# Patient Record
Sex: FEMALE | Race: WHITE | NOT HISPANIC OR LATINO | Employment: OTHER | URBAN - NONMETROPOLITAN AREA
[De-identification: names, ages, dates, MRNs, and addresses within clinical notes are randomized per-mention and may not be internally consistent; named-entity substitution may affect disease eponyms.]

---

## 2017-03-24 ENCOUNTER — APPOINTMENT (OUTPATIENT)
Dept: GENERAL RADIOLOGY | Facility: HOSPITAL | Age: 82
End: 2017-03-24

## 2017-03-24 ENCOUNTER — HOSPITAL ENCOUNTER (INPATIENT)
Facility: HOSPITAL | Age: 82
LOS: 1 days | Discharge: HOME | End: 2017-03-26
Attending: FAMILY MEDICINE | Admitting: INTERNAL MEDICINE

## 2017-03-24 ENCOUNTER — APPOINTMENT (OUTPATIENT)
Dept: MRI IMAGING | Facility: HOSPITAL | Age: 82
End: 2017-03-24

## 2017-03-24 ENCOUNTER — APPOINTMENT (OUTPATIENT)
Dept: CT IMAGING | Facility: HOSPITAL | Age: 82
End: 2017-03-24

## 2017-03-24 DIAGNOSIS — R41.82 ALTERED MENTAL STATUS, UNSPECIFIED ALTERED MENTAL STATUS TYPE: ICD-10-CM

## 2017-03-24 DIAGNOSIS — G89.29 CHRONIC BACK PAIN, UNSPECIFIED BACK LOCATION, UNSPECIFIED BACK PAIN LATERALITY: ICD-10-CM

## 2017-03-24 DIAGNOSIS — M54.9 CHRONIC BACK PAIN, UNSPECIFIED BACK LOCATION, UNSPECIFIED BACK PAIN LATERALITY: ICD-10-CM

## 2017-03-24 DIAGNOSIS — R50.9 FEVER IN ADULT: Primary | ICD-10-CM

## 2017-03-24 LAB
ALBUMIN SERPL-MCNC: 4.1 G/DL (ref 3.5–5)
ALBUMIN/GLOB SERPL: 1.4 G/DL (ref 1.1–2.5)
ALP SERPL-CCNC: 62 U/L (ref 24–120)
ALT SERPL W P-5'-P-CCNC: 20 U/L (ref 0–54)
ANION GAP SERPL CALCULATED.3IONS-SCNC: 13 MMOL/L (ref 4–13)
ARTERIAL PATENCY WRIST A: ABNORMAL
AST SERPL-CCNC: 40 U/L (ref 7–45)
ATMOSPHERIC PRESS: ABNORMAL MMHG
BASE EXCESS BLDA CALC-SCNC: -3.9 MMOL/L (ref -2–2)
BASOPHILS # BLD AUTO: 0.03 10*3/MM3 (ref 0–0.2)
BASOPHILS NFR BLD AUTO: 0.4 % (ref 0–2)
BDY SITE: ABNORMAL
BILIRUB SERPL-MCNC: 0.5 MG/DL (ref 0.1–1)
BILIRUB UR QL STRIP: NEGATIVE
BUN BLD-MCNC: 36 MG/DL (ref 5–21)
BUN/CREAT SERPL: 29.5 (ref 7–25)
CALCIUM SPEC-SCNC: 7.6 MG/DL (ref 8.4–10.4)
CHLORIDE SERPL-SCNC: 104 MMOL/L (ref 98–110)
CK MB SERPL-CCNC: 1.99 NG/ML (ref 0–5)
CLARITY UR: CLEAR
CO2 SERPL-SCNC: 26 MMOL/L (ref 24–31)
COLOR UR: YELLOW
CREAT BLD-MCNC: 1.22 MG/DL (ref 0.5–1.4)
CRP SERPL-MCNC: 4.42 MG/DL (ref 0–0.99)
D-LACTATE SERPL-SCNC: 0.6 MMOL/L (ref 0.5–2)
DEPRECATED RDW RBC AUTO: 53.4 FL (ref 40–54)
EOSINOPHIL # BLD AUTO: 0.21 10*3/MM3 (ref 0–0.7)
EOSINOPHIL NFR BLD AUTO: 2.6 % (ref 0–4)
ERYTHROCYTE [DISTWIDTH] IN BLOOD BY AUTOMATED COUNT: 14.9 % (ref 12–15)
ERYTHROCYTE [SEDIMENTATION RATE] IN BLOOD: 16 MM/HR (ref 0–20)
FLUAV AG NPH QL: NEGATIVE
FLUBV AG NPH QL IA: NEGATIVE
GAS FLOW AIRWAY: 3 LPM
GFR SERPL CREATININE-BSD FRML MDRD: 42 ML/MIN/1.73
GLOBULIN UR ELPH-MCNC: 2.9 GM/DL
GLUCOSE BLD-MCNC: 132 MG/DL (ref 70–100)
GLUCOSE UR STRIP-MCNC: NEGATIVE MG/DL
HCO3 BLDA-SCNC: 21.8 MMOL/L (ref 22–26)
HCT VFR BLD AUTO: 40.5 % (ref 37–47)
HGB BLD-MCNC: 12.7 G/DL (ref 12–16)
HGB UR QL STRIP.AUTO: NEGATIVE
IMM GRANULOCYTES # BLD: 0.04 10*3/MM3 (ref 0–0.03)
IMM GRANULOCYTES NFR BLD: 0.5 % (ref 0–5)
KETONES UR QL STRIP: NEGATIVE
LEUKOCYTE ESTERASE UR QL STRIP.AUTO: NEGATIVE
LYMPHOCYTES # BLD AUTO: 0.34 10*3/MM3 (ref 0.72–4.86)
LYMPHOCYTES NFR BLD AUTO: 4.2 % (ref 15–45)
MCH RBC QN AUTO: 30.7 PG (ref 28–32)
MCHC RBC AUTO-ENTMCNC: 31.4 G/DL (ref 33–36)
MCV RBC AUTO: 97.8 FL (ref 82–98)
MODALITY: ABNORMAL
MONOCYTES # BLD AUTO: 0.4 10*3/MM3 (ref 0.19–1.3)
MONOCYTES NFR BLD AUTO: 5 % (ref 4–12)
MYOGLOBIN SERPL-MCNC: 71.6 NG/ML (ref 0–110)
NEUTROPHILS # BLD AUTO: 7.02 10*3/MM3 (ref 1.87–8.4)
NEUTROPHILS NFR BLD AUTO: 87.3 % (ref 39–78)
NITRITE UR QL STRIP: NEGATIVE
PCO2 BLDA: 42.1 MM HG (ref 35–45)
PH BLDA: 7.33 PH UNITS (ref 7.35–7.45)
PH UR STRIP.AUTO: <=5 [PH] (ref 5–8)
PLATELET # BLD AUTO: 261 10*3/MM3 (ref 130–400)
PMV BLD AUTO: 11.3 FL (ref 6–12)
PO2 BLDA: 88.5 MM HG (ref 80–100)
POTASSIUM BLD-SCNC: 4.3 MMOL/L (ref 3.5–5.3)
PROCALCITONIN SERPL-MCNC: 0.3 NG/ML
PROT SERPL-MCNC: 7 G/DL (ref 6.3–8.7)
PROT UR QL STRIP: NEGATIVE
RBC # BLD AUTO: 4.14 10*6/MM3 (ref 4.2–5.4)
SAO2 % BLDCOA: 96.2 % (ref 94–100)
SAO2 % BLDCOA: 96.2 % (ref 94–100)
SODIUM BLD-SCNC: 143 MMOL/L (ref 135–145)
SP GR UR STRIP: 1.01 (ref 1–1.03)
UROBILINOGEN UR QL STRIP: NORMAL
WBC NRBC COR # BLD: 8.04 10*3/MM3 (ref 4.8–10.8)

## 2017-03-24 PROCEDURE — 85025 COMPLETE CBC W/AUTO DIFF WBC: CPT | Performed by: FAMILY MEDICINE

## 2017-03-24 PROCEDURE — 82553 CREATINE MB FRACTION: CPT | Performed by: FAMILY MEDICINE

## 2017-03-24 PROCEDURE — 81003 URINALYSIS AUTO W/O SCOPE: CPT | Performed by: FAMILY MEDICINE

## 2017-03-24 PROCEDURE — 70450 CT HEAD/BRAIN W/O DYE: CPT

## 2017-03-24 PROCEDURE — 93010 ELECTROCARDIOGRAM REPORT: CPT | Performed by: INTERNAL MEDICINE

## 2017-03-24 PROCEDURE — 82803 BLOOD GASES ANY COMBINATION: CPT

## 2017-03-24 PROCEDURE — 71010 HC CHEST PA OR AP: CPT

## 2017-03-24 PROCEDURE — 72148 MRI LUMBAR SPINE W/O DYE: CPT

## 2017-03-24 PROCEDURE — 87040 BLOOD CULTURE FOR BACTERIA: CPT | Performed by: FAMILY MEDICINE

## 2017-03-24 PROCEDURE — 25010000002 ONDANSETRON PER 1 MG: Performed by: FAMILY MEDICINE

## 2017-03-24 PROCEDURE — 85651 RBC SED RATE NONAUTOMATED: CPT | Performed by: FAMILY MEDICINE

## 2017-03-24 PROCEDURE — 83605 ASSAY OF LACTIC ACID: CPT | Performed by: FAMILY MEDICINE

## 2017-03-24 PROCEDURE — P9612 CATHETERIZE FOR URINE SPEC: HCPCS

## 2017-03-24 PROCEDURE — 36600 WITHDRAWAL OF ARTERIAL BLOOD: CPT

## 2017-03-24 PROCEDURE — 87804 INFLUENZA ASSAY W/OPTIC: CPT | Performed by: FAMILY MEDICINE

## 2017-03-24 PROCEDURE — 83874 ASSAY OF MYOGLOBIN: CPT | Performed by: FAMILY MEDICINE

## 2017-03-24 PROCEDURE — 84145 PROCALCITONIN (PCT): CPT | Performed by: FAMILY MEDICINE

## 2017-03-24 PROCEDURE — 99285 EMERGENCY DEPT VISIT HI MDM: CPT

## 2017-03-24 PROCEDURE — 86140 C-REACTIVE PROTEIN: CPT | Performed by: FAMILY MEDICINE

## 2017-03-24 PROCEDURE — 25010000002 AZITHROMYCIN: Performed by: FAMILY MEDICINE

## 2017-03-24 PROCEDURE — 80053 COMPREHEN METABOLIC PANEL: CPT | Performed by: FAMILY MEDICINE

## 2017-03-24 PROCEDURE — 93005 ELECTROCARDIOGRAM TRACING: CPT | Performed by: FAMILY MEDICINE

## 2017-03-24 RX ORDER — ATORVASTATIN CALCIUM 80 MG/1
80 TABLET, FILM COATED ORAL DAILY
COMMUNITY

## 2017-03-24 RX ORDER — ISOSORBIDE MONONITRATE 60 MG/1
90 TABLET, EXTENDED RELEASE ORAL DAILY
COMMUNITY

## 2017-03-24 RX ORDER — SODIUM CHLORIDE 9 MG/ML
125 INJECTION, SOLUTION INTRAVENOUS CONTINUOUS
Status: DISCONTINUED | OUTPATIENT
Start: 2017-03-24 | End: 2017-03-25

## 2017-03-24 RX ORDER — SENNA PLUS 8.6 MG/1
1 TABLET ORAL DAILY PRN
COMMUNITY
End: 2020-01-06 | Stop reason: HOSPADM

## 2017-03-24 RX ORDER — ASPIRIN 81 MG/1
81 TABLET ORAL DAILY
COMMUNITY

## 2017-03-24 RX ORDER — SERTRALINE HYDROCHLORIDE 100 MG/1
100 TABLET, FILM COATED ORAL DAILY
COMMUNITY

## 2017-03-24 RX ORDER — BUDESONIDE AND FORMOTEROL FUMARATE DIHYDRATE 160; 4.5 UG/1; UG/1
2 AEROSOL RESPIRATORY (INHALATION)
COMMUNITY

## 2017-03-24 RX ORDER — ATENOLOL 25 MG/1
12.5 TABLET ORAL DAILY
COMMUNITY

## 2017-03-24 RX ORDER — OMEPRAZOLE 40 MG/1
40 CAPSULE, DELAYED RELEASE ORAL 2 TIMES DAILY
COMMUNITY

## 2017-03-24 RX ORDER — GABAPENTIN 300 MG/1
600 CAPSULE ORAL 2 TIMES DAILY
Status: ON HOLD | COMMUNITY
End: 2020-01-06 | Stop reason: SDUPTHER

## 2017-03-24 RX ORDER — ONDANSETRON 2 MG/ML
4 INJECTION INTRAMUSCULAR; INTRAVENOUS ONCE
Status: COMPLETED | OUTPATIENT
Start: 2017-03-24 | End: 2017-03-24

## 2017-03-24 RX ORDER — FUROSEMIDE 40 MG/1
TABLET ORAL TAKE AS DIRECTED
Status: ON HOLD | COMMUNITY
End: 2020-01-06 | Stop reason: SDUPTHER

## 2017-03-24 RX ORDER — OXYCODONE AND ACETAMINOPHEN 10; 325 MG/1; MG/1
1 TABLET ORAL EVERY 6 HOURS PRN
Status: ON HOLD | COMMUNITY
End: 2020-01-06 | Stop reason: SDUPTHER

## 2017-03-24 RX ADMIN — AZITHROMYCIN 500 MG: 500 INJECTION, POWDER, LYOPHILIZED, FOR SOLUTION INTRAVENOUS at 21:16

## 2017-03-24 RX ADMIN — ACETAMINOPHEN 650 MG: 325 SUPPOSITORY RECTAL at 21:27

## 2017-03-24 RX ADMIN — ONDANSETRON HYDROCHLORIDE 4 MG: 2 SOLUTION INTRAMUSCULAR; INTRAVENOUS at 21:16

## 2017-03-24 RX ADMIN — SODIUM CHLORIDE 1000 ML: 9 INJECTION, SOLUTION INTRAVENOUS at 21:17

## 2017-03-25 ENCOUNTER — APPOINTMENT (OUTPATIENT)
Dept: MRI IMAGING | Facility: HOSPITAL | Age: 82
End: 2017-03-25

## 2017-03-25 PROBLEM — R41.82 ALTERED MENTAL STATUS: Status: ACTIVE | Noted: 2017-03-25

## 2017-03-25 PROBLEM — R50.9 FEVER IN ADULT: Status: ACTIVE | Noted: 2017-03-25

## 2017-03-25 LAB
AMMONIA BLD-SCNC: <9 UMOL/L (ref 9–33)
APAP SERPL-MCNC: <10 MCG/ML (ref 10–30)
HOLD SPECIMEN: NORMAL
HOLD SPECIMEN: NORMAL
LIPASE SERPL-CCNC: 19 U/L (ref 23–203)
SALICYLATES SERPL-MCNC: <1 MG/DL (ref 15–30)
TROPONIN I SERPL-MCNC: <0.012 NG/ML (ref 0–0.03)
TROPONIN I SERPL-MCNC: <0.012 NG/ML (ref 0–0.03)
WHOLE BLOOD HOLD SPECIMEN: NORMAL
WHOLE BLOOD HOLD SPECIMEN: NORMAL

## 2017-03-25 PROCEDURE — 25010000002 MEROPENEM PER 100 MG: Performed by: INTERNAL MEDICINE

## 2017-03-25 PROCEDURE — 25010000002 ACYCLOVIR PER 5 MG: Performed by: INTERNAL MEDICINE

## 2017-03-25 PROCEDURE — 87040 BLOOD CULTURE FOR BACTERIA: CPT | Performed by: INTERNAL MEDICINE

## 2017-03-25 PROCEDURE — 25010000002 VANCOMYCIN: Performed by: INTERNAL MEDICINE

## 2017-03-25 PROCEDURE — 25010000002 ENOXAPARIN PER 10 MG: Performed by: NURSE PRACTITIONER

## 2017-03-25 PROCEDURE — 94799 UNLISTED PULMONARY SVC/PX: CPT

## 2017-03-25 PROCEDURE — 82140 ASSAY OF AMMONIA: CPT | Performed by: INTERNAL MEDICINE

## 2017-03-25 PROCEDURE — 63710000001 METHYLPREDNISOLONE 4 MG TABLET THERAPY PACK: Performed by: NURSE PRACTITIONER

## 2017-03-25 PROCEDURE — 84484 ASSAY OF TROPONIN QUANT: CPT | Performed by: INTERNAL MEDICINE

## 2017-03-25 PROCEDURE — 80307 DRUG TEST PRSMV CHEM ANLYZR: CPT | Performed by: INTERNAL MEDICINE

## 2017-03-25 PROCEDURE — 70551 MRI BRAIN STEM W/O DYE: CPT

## 2017-03-25 PROCEDURE — 94640 AIRWAY INHALATION TREATMENT: CPT

## 2017-03-25 PROCEDURE — 83690 ASSAY OF LIPASE: CPT | Performed by: INTERNAL MEDICINE

## 2017-03-25 PROCEDURE — 25010000003 DEXAMETHASONE SODIUM PHOSPHATE 100 MG/10ML SOLUTION 10 ML VIAL: Performed by: NURSE PRACTITIONER

## 2017-03-25 RX ORDER — GABAPENTIN 300 MG/1
300 CAPSULE ORAL EVERY MORNING
Status: DISCONTINUED | OUTPATIENT
Start: 2017-03-25 | End: 2017-03-26 | Stop reason: HOSPADM

## 2017-03-25 RX ORDER — NALOXONE HYDROCHLORIDE 1 MG/ML
1 INJECTION INTRAMUSCULAR; INTRAVENOUS; SUBCUTANEOUS AS NEEDED
Status: DISCONTINUED | OUTPATIENT
Start: 2017-03-25 | End: 2017-03-26 | Stop reason: HOSPADM

## 2017-03-25 RX ORDER — ASPIRIN 81 MG/1
81 TABLET ORAL DAILY
Status: DISCONTINUED | OUTPATIENT
Start: 2017-03-25 | End: 2017-03-26 | Stop reason: HOSPADM

## 2017-03-25 RX ORDER — PANTOPRAZOLE SODIUM 40 MG/10ML
40 INJECTION, POWDER, LYOPHILIZED, FOR SOLUTION INTRAVENOUS
Status: DISCONTINUED | OUTPATIENT
Start: 2017-03-25 | End: 2017-03-25 | Stop reason: ALTCHOICE

## 2017-03-25 RX ORDER — GABAPENTIN 300 MG/1
600 CAPSULE ORAL NIGHTLY
Status: DISCONTINUED | OUTPATIENT
Start: 2017-03-25 | End: 2017-03-26 | Stop reason: HOSPADM

## 2017-03-25 RX ORDER — METHYLPREDNISOLONE 4 MG/1
4 TABLET ORAL
Status: COMPLETED | OUTPATIENT
Start: 2017-03-25 | End: 2017-03-25

## 2017-03-25 RX ORDER — PANTOPRAZOLE SODIUM 40 MG/1
40 TABLET, DELAYED RELEASE ORAL
Status: DISCONTINUED | OUTPATIENT
Start: 2017-03-25 | End: 2017-03-26 | Stop reason: HOSPADM

## 2017-03-25 RX ORDER — METHYLPREDNISOLONE 4 MG/1
4 TABLET ORAL 2 TIMES DAILY
Status: DISCONTINUED | OUTPATIENT
Start: 2017-03-29 | End: 2017-03-26 | Stop reason: HOSPADM

## 2017-03-25 RX ORDER — OXYCODONE AND ACETAMINOPHEN 10; 325 MG/1; MG/1
1 TABLET ORAL EVERY 6 HOURS PRN
Status: DISCONTINUED | OUTPATIENT
Start: 2017-03-25 | End: 2017-03-25

## 2017-03-25 RX ORDER — ATENOLOL 25 MG/1
25 TABLET ORAL DAILY
Status: DISCONTINUED | OUTPATIENT
Start: 2017-03-25 | End: 2017-03-26 | Stop reason: HOSPADM

## 2017-03-25 RX ORDER — METHYLPREDNISOLONE 4 MG/1
8 TABLET ORAL
Status: COMPLETED | OUTPATIENT
Start: 2017-03-25 | End: 2017-03-25

## 2017-03-25 RX ORDER — NAPROXEN 250 MG/1
250 TABLET ORAL 2 TIMES DAILY PRN
Status: DISCONTINUED | OUTPATIENT
Start: 2017-03-25 | End: 2017-03-25

## 2017-03-25 RX ORDER — NITROGLYCERIN 0.4 MG/1
0.4 TABLET SUBLINGUAL
Status: DISCONTINUED | OUTPATIENT
Start: 2017-03-25 | End: 2017-03-26 | Stop reason: HOSPADM

## 2017-03-25 RX ORDER — DIPHENHYDRAMINE HCL 25 MG
25 CAPSULE ORAL EVERY 6 HOURS PRN
COMMUNITY
End: 2020-01-06 | Stop reason: HOSPADM

## 2017-03-25 RX ORDER — GABAPENTIN 300 MG/1
600 CAPSULE ORAL NIGHTLY
Status: ON HOLD | COMMUNITY
End: 2020-01-03

## 2017-03-25 RX ORDER — BUDESONIDE AND FORMOTEROL FUMARATE DIHYDRATE 160; 4.5 UG/1; UG/1
2 AEROSOL RESPIRATORY (INHALATION)
Status: DISCONTINUED | OUTPATIENT
Start: 2017-03-25 | End: 2017-03-26 | Stop reason: HOSPADM

## 2017-03-25 RX ORDER — SERTRALINE HYDROCHLORIDE 100 MG/1
100 TABLET, FILM COATED ORAL DAILY
Status: DISCONTINUED | OUTPATIENT
Start: 2017-03-25 | End: 2017-03-26 | Stop reason: HOSPADM

## 2017-03-25 RX ORDER — METHYLPREDNISOLONE 4 MG/1
4 TABLET ORAL
Status: DISCONTINUED | OUTPATIENT
Start: 2017-03-30 | End: 2017-03-26 | Stop reason: HOSPADM

## 2017-03-25 RX ORDER — METHYLPREDNISOLONE 4 MG/1
8 TABLET ORAL
Status: DISCONTINUED | OUTPATIENT
Start: 2017-03-26 | End: 2017-03-26 | Stop reason: HOSPADM

## 2017-03-25 RX ORDER — METHYLPREDNISOLONE 4 MG/1
4 TABLET ORAL
Status: DISCONTINUED | OUTPATIENT
Start: 2017-03-26 | End: 2017-03-26 | Stop reason: HOSPADM

## 2017-03-25 RX ORDER — NAPROXEN 250 MG/1
250 TABLET ORAL 2 TIMES DAILY PRN
Status: ON HOLD | COMMUNITY
End: 2020-01-03

## 2017-03-25 RX ORDER — SODIUM CHLORIDE 9 MG/ML
100 INJECTION, SOLUTION INTRAVENOUS CONTINUOUS
Status: DISCONTINUED | OUTPATIENT
Start: 2017-03-25 | End: 2017-03-26 | Stop reason: HOSPADM

## 2017-03-25 RX ORDER — ONDANSETRON 2 MG/ML
4 INJECTION INTRAMUSCULAR; INTRAVENOUS EVERY 6 HOURS PRN
Status: DISCONTINUED | OUTPATIENT
Start: 2017-03-25 | End: 2017-03-26 | Stop reason: HOSPADM

## 2017-03-25 RX ORDER — OXYCODONE AND ACETAMINOPHEN 10; 325 MG/1; MG/1
1 TABLET ORAL EVERY 4 HOURS PRN
Status: DISCONTINUED | OUTPATIENT
Start: 2017-03-25 | End: 2017-03-26 | Stop reason: HOSPADM

## 2017-03-25 RX ORDER — DIPHENHYDRAMINE HCL 25 MG
25 CAPSULE ORAL EVERY 6 HOURS PRN
Status: DISCONTINUED | OUTPATIENT
Start: 2017-03-25 | End: 2017-03-26 | Stop reason: HOSPADM

## 2017-03-25 RX ORDER — FUROSEMIDE 40 MG/1
40 TABLET ORAL 2 TIMES DAILY
Status: DISCONTINUED | OUTPATIENT
Start: 2017-03-25 | End: 2017-03-26 | Stop reason: HOSPADM

## 2017-03-25 RX ORDER — METHYLPREDNISOLONE 4 MG/1
4 TABLET ORAL
Status: DISCONTINUED | OUTPATIENT
Start: 2017-03-27 | End: 2017-03-26 | Stop reason: HOSPADM

## 2017-03-25 RX ORDER — ATORVASTATIN CALCIUM 40 MG/1
80 TABLET, FILM COATED ORAL NIGHTLY
Status: DISCONTINUED | OUTPATIENT
Start: 2017-03-25 | End: 2017-03-26 | Stop reason: HOSPADM

## 2017-03-25 RX ORDER — KETOTIFEN FUMARATE 0.35 MG/ML
1 SOLUTION/ DROPS OPHTHALMIC DAILY PRN
Status: ON HOLD | COMMUNITY
End: 2020-01-03

## 2017-03-25 RX ORDER — METHYLPREDNISOLONE 4 MG/1
4 TABLET ORAL
Status: DISCONTINUED | OUTPATIENT
Start: 2017-03-28 | End: 2017-03-26 | Stop reason: HOSPADM

## 2017-03-25 RX ADMIN — METHYLPREDNISOLONE 4 MG: 4 TABLET ORAL at 17:31

## 2017-03-25 RX ADMIN — MEROPENEM 2 G: 1 INJECTION INTRAVENOUS at 04:20

## 2017-03-25 RX ADMIN — SERTRALINE 100 MG: 100 TABLET, FILM COATED ORAL at 13:02

## 2017-03-25 RX ADMIN — BUDESONIDE AND FORMOTEROL FUMARATE DIHYDRATE 2 PUFF: 160; 4.5 AEROSOL RESPIRATORY (INHALATION) at 19:04

## 2017-03-25 RX ADMIN — FUROSEMIDE 40 MG: 40 TABLET ORAL at 17:35

## 2017-03-25 RX ADMIN — ENOXAPARIN SODIUM 30 MG: 30 INJECTION SUBCUTANEOUS at 13:13

## 2017-03-25 RX ADMIN — SODIUM CHLORIDE 125 ML/HR: 9 INJECTION, SOLUTION INTRAVENOUS at 01:14

## 2017-03-25 RX ADMIN — METHYLPREDNISOLONE 4 MG: 4 TABLET ORAL at 13:22

## 2017-03-25 RX ADMIN — PANTOPRAZOLE SODIUM 40 MG: 40 TABLET, DELAYED RELEASE ORAL at 13:12

## 2017-03-25 RX ADMIN — METHYLPREDNISOLONE 8 MG: 4 TABLET ORAL at 13:03

## 2017-03-25 RX ADMIN — ISOSORBIDE MONONITRATE 90 MG: 30 TABLET ORAL at 13:02

## 2017-03-25 RX ADMIN — MEROPENEM 2 G: 1 INJECTION INTRAVENOUS at 11:25

## 2017-03-25 RX ADMIN — OXYCODONE HYDROCHLORIDE AND ACETAMINOPHEN 1 TABLET: 10; 325 TABLET ORAL at 17:30

## 2017-03-25 RX ADMIN — FUROSEMIDE 40 MG: 40 TABLET ORAL at 13:12

## 2017-03-25 RX ADMIN — ATENOLOL 25 MG: 25 TABLET ORAL at 13:03

## 2017-03-25 RX ADMIN — METHYLPREDNISOLONE 8 MG: 4 TABLET ORAL at 20:20

## 2017-03-25 RX ADMIN — PANTOPRAZOLE SODIUM 40 MG: 40 INJECTION, POWDER, FOR SOLUTION INTRAVENOUS at 05:20

## 2017-03-25 RX ADMIN — GABAPENTIN 300 MG: 300 CAPSULE ORAL at 13:03

## 2017-03-25 RX ADMIN — ACYCLOVIR SODIUM 590 MG: 500 INJECTION, SOLUTION INTRAVENOUS at 13:21

## 2017-03-25 RX ADMIN — ASPIRIN 81 MG: 81 TABLET ORAL at 13:03

## 2017-03-25 RX ADMIN — GABAPENTIN 600 MG: 300 CAPSULE ORAL at 20:21

## 2017-03-25 RX ADMIN — VANCOMYCIN HYDROCHLORIDE 500 MG: 500 INJECTION, POWDER, LYOPHILIZED, FOR SOLUTION INTRAVENOUS at 05:22

## 2017-03-25 RX ADMIN — ACYCLOVIR SODIUM 590 MG: 500 INJECTION, SOLUTION INTRAVENOUS at 03:10

## 2017-03-25 RX ADMIN — DESMOPRESSIN ACETATE 80 MG: 0.2 TABLET ORAL at 20:20

## 2017-03-25 RX ADMIN — DEXAMETHASONE SODIUM PHOSPHATE 10 MG: 10 INJECTION, SOLUTION INTRAMUSCULAR; INTRAVENOUS at 13:04

## 2017-03-25 RX ADMIN — OXYCODONE HYDROCHLORIDE AND ACETAMINOPHEN 1 TABLET: 10; 325 TABLET ORAL at 11:25

## 2017-03-26 VITALS
BODY MASS INDEX: 17.71 KG/M2 | DIASTOLIC BLOOD PRESSURE: 60 MMHG | WEIGHT: 110.2 LBS | TEMPERATURE: 97.5 F | HEART RATE: 73 BPM | OXYGEN SATURATION: 95 % | SYSTOLIC BLOOD PRESSURE: 131 MMHG | HEIGHT: 66 IN | RESPIRATION RATE: 18 BRPM

## 2017-03-26 LAB
ALBUMIN SERPL-MCNC: 3.1 G/DL (ref 3.5–5)
ALBUMIN/GLOB SERPL: 1.1 G/DL (ref 1.1–2.5)
ALP SERPL-CCNC: 48 U/L (ref 24–120)
ALT SERPL W P-5'-P-CCNC: 24 U/L (ref 0–54)
ANION GAP SERPL CALCULATED.3IONS-SCNC: 8 MMOL/L (ref 4–13)
AST SERPL-CCNC: 27 U/L (ref 7–45)
BASOPHILS # BLD AUTO: 0.01 10*3/MM3 (ref 0–0.2)
BASOPHILS NFR BLD AUTO: 0.2 % (ref 0–2)
BILIRUB SERPL-MCNC: 0.3 MG/DL (ref 0.1–1)
BUN BLD-MCNC: 26 MG/DL (ref 5–21)
BUN/CREAT SERPL: 23.2 (ref 7–25)
CALCIUM SPEC-SCNC: 6.4 MG/DL (ref 8.4–10.4)
CHLORIDE SERPL-SCNC: 106 MMOL/L (ref 98–110)
CO2 SERPL-SCNC: 25 MMOL/L (ref 24–31)
CREAT BLD-MCNC: 1.12 MG/DL (ref 0.5–1.4)
CRP SERPL-MCNC: 5.8 MG/DL (ref 0–0.99)
DEPRECATED RDW RBC AUTO: 52.9 FL (ref 40–54)
EOSINOPHIL # BLD AUTO: 0 10*3/MM3 (ref 0–0.7)
EOSINOPHIL NFR BLD AUTO: 0 % (ref 0–4)
ERYTHROCYTE [DISTWIDTH] IN BLOOD BY AUTOMATED COUNT: 15 % (ref 12–15)
GFR SERPL CREATININE-BSD FRML MDRD: 46 ML/MIN/1.73
GLOBULIN UR ELPH-MCNC: 2.7 GM/DL
GLUCOSE BLD-MCNC: 117 MG/DL (ref 70–100)
HCT VFR BLD AUTO: 31.4 % (ref 37–47)
HGB BLD-MCNC: 10 G/DL (ref 12–16)
IMM GRANULOCYTES # BLD: 0.02 10*3/MM3 (ref 0–0.03)
IMM GRANULOCYTES NFR BLD: 0.4 % (ref 0–5)
LYMPHOCYTES # BLD AUTO: 0.83 10*3/MM3 (ref 0.72–4.86)
LYMPHOCYTES NFR BLD AUTO: 15 % (ref 15–45)
MCH RBC QN AUTO: 30.5 PG (ref 28–32)
MCHC RBC AUTO-ENTMCNC: 31.8 G/DL (ref 33–36)
MCV RBC AUTO: 95.7 FL (ref 82–98)
MONOCYTES # BLD AUTO: 0.54 10*3/MM3 (ref 0.19–1.3)
MONOCYTES NFR BLD AUTO: 9.7 % (ref 4–12)
NEUTROPHILS # BLD AUTO: 4.15 10*3/MM3 (ref 1.87–8.4)
NEUTROPHILS NFR BLD AUTO: 74.7 % (ref 39–78)
PLATELET # BLD AUTO: 207 10*3/MM3 (ref 130–400)
PMV BLD AUTO: 11.1 FL (ref 6–12)
POTASSIUM BLD-SCNC: 3.7 MMOL/L (ref 3.5–5.3)
PROT SERPL-MCNC: 5.8 G/DL (ref 6.3–8.7)
RBC # BLD AUTO: 3.28 10*6/MM3 (ref 4.2–5.4)
SODIUM BLD-SCNC: 139 MMOL/L (ref 135–145)
WBC NRBC COR # BLD: 5.55 10*3/MM3 (ref 4.8–10.8)

## 2017-03-26 PROCEDURE — 86140 C-REACTIVE PROTEIN: CPT | Performed by: NURSE PRACTITIONER

## 2017-03-26 PROCEDURE — 63710000001 METHYLPREDNISOLONE 4 MG TABLET THERAPY PACK: Performed by: NURSE PRACTITIONER

## 2017-03-26 PROCEDURE — 85025 COMPLETE CBC W/AUTO DIFF WBC: CPT | Performed by: NURSE PRACTITIONER

## 2017-03-26 PROCEDURE — 80053 COMPREHEN METABOLIC PANEL: CPT | Performed by: NURSE PRACTITIONER

## 2017-03-26 PROCEDURE — 94799 UNLISTED PULMONARY SVC/PX: CPT

## 2017-03-26 RX ORDER — METHYLPREDNISOLONE 4 MG/1
TABLET ORAL
Qty: 21 TABLET | Refills: 0 | Status: ON HOLD | OUTPATIENT
Start: 2017-03-26 | End: 2020-01-03

## 2017-03-26 RX ORDER — METHYLPREDNISOLONE 4 MG/1
TABLET ORAL
Qty: 21 TABLET | Refills: 0 | Status: ON HOLD | OUTPATIENT
Start: 2017-03-30 | End: 2020-01-03 | Stop reason: SDUPTHER

## 2017-03-26 RX ORDER — METHYLPREDNISOLONE 4 MG/1
TABLET ORAL
Qty: 21 TABLET | Refills: 0 | Status: ON HOLD | OUTPATIENT
Start: 2017-03-27 | End: 2020-01-03 | Stop reason: SDUPTHER

## 2017-03-26 RX ORDER — METHYLPREDNISOLONE 4 MG/1
TABLET ORAL
Qty: 21 TABLET | Refills: 0 | Status: ON HOLD | OUTPATIENT
Start: 2017-03-28 | End: 2020-01-03 | Stop reason: SDUPTHER

## 2017-03-26 RX ORDER — METHYLPREDNISOLONE 4 MG/1
TABLET ORAL
Qty: 21 TABLET | Refills: 0 | Status: ON HOLD | OUTPATIENT
Start: 2017-03-26 | End: 2020-01-03 | Stop reason: SDUPTHER

## 2017-03-26 RX ORDER — METHYLPREDNISOLONE 4 MG/1
TABLET ORAL
Qty: 21 TABLET | Refills: 0 | Status: ON HOLD | OUTPATIENT
Start: 2017-03-29 | End: 2020-01-03 | Stop reason: SDUPTHER

## 2017-03-26 RX ADMIN — ISOSORBIDE MONONITRATE 90 MG: 30 TABLET ORAL at 08:59

## 2017-03-26 RX ADMIN — BUDESONIDE AND FORMOTEROL FUMARATE DIHYDRATE 2 PUFF: 160; 4.5 AEROSOL RESPIRATORY (INHALATION) at 07:13

## 2017-03-26 RX ADMIN — FUROSEMIDE 40 MG: 40 TABLET ORAL at 09:00

## 2017-03-26 RX ADMIN — PANTOPRAZOLE SODIUM 40 MG: 40 TABLET, DELAYED RELEASE ORAL at 06:20

## 2017-03-26 RX ADMIN — ATENOLOL 25 MG: 25 TABLET ORAL at 08:59

## 2017-03-26 RX ADMIN — SERTRALINE 100 MG: 100 TABLET, FILM COATED ORAL at 09:00

## 2017-03-26 RX ADMIN — GABAPENTIN 300 MG: 300 CAPSULE ORAL at 09:00

## 2017-03-26 RX ADMIN — ASPIRIN 81 MG: 81 TABLET ORAL at 08:59

## 2017-03-26 RX ADMIN — OXYCODONE HYDROCHLORIDE AND ACETAMINOPHEN 1 TABLET: 10; 325 TABLET ORAL at 09:55

## 2017-03-26 RX ADMIN — METHYLPREDNISOLONE 4 MG: 4 TABLET ORAL at 09:00

## 2017-03-26 RX ADMIN — OXYCODONE HYDROCHLORIDE AND ACETAMINOPHEN 1 TABLET: 10; 325 TABLET ORAL at 03:35

## 2017-03-29 LAB — BACTERIA SPEC AEROBE CULT: NORMAL

## 2017-03-30 LAB — BACTERIA SPEC AEROBE CULT: NORMAL

## 2018-04-04 ENCOUNTER — OFFICE (OUTPATIENT)
Dept: URBAN - METROPOLITAN AREA CLINIC 10 | Facility: CLINIC | Age: 83
End: 2018-04-04
Payer: COMMERCIAL

## 2018-04-04 VITALS
HEART RATE: 54 BPM | HEIGHT: 64 IN | WEIGHT: 117 LBS | SYSTOLIC BLOOD PRESSURE: 108 MMHG | DIASTOLIC BLOOD PRESSURE: 53 MMHG

## 2018-04-04 DIAGNOSIS — R63.6 UNDERWEIGHT: ICD-10-CM

## 2018-04-04 DIAGNOSIS — R93.8 ABNORMAL FINDINGS ON DIAGNOSTIC IMAGING OF OTHER SPECIFIED B: ICD-10-CM

## 2018-04-04 PROCEDURE — 99204 OFFICE O/P NEW MOD 45 MIN: CPT

## 2018-04-05 ENCOUNTER — AMBULATORY SURGICAL CENTER (OUTPATIENT)
Dept: URBAN - METROPOLITAN AREA SURGERY 1 | Facility: SURGERY | Age: 83
End: 2018-04-05
Payer: COMMERCIAL

## 2018-04-05 ENCOUNTER — OFFICE (OUTPATIENT)
Dept: URBAN - METROPOLITAN AREA PATHOLOGY 22 | Facility: PATHOLOGY | Age: 83
End: 2018-04-05
Payer: COMMERCIAL

## 2018-04-05 VITALS
OXYGEN SATURATION: 100 % | TEMPERATURE: 98.3 F | SYSTOLIC BLOOD PRESSURE: 130 MMHG | SYSTOLIC BLOOD PRESSURE: 175 MMHG | DIASTOLIC BLOOD PRESSURE: 59 MMHG | OXYGEN SATURATION: 98 % | DIASTOLIC BLOOD PRESSURE: 60 MMHG | SYSTOLIC BLOOD PRESSURE: 124 MMHG | SYSTOLIC BLOOD PRESSURE: 121 MMHG | DIASTOLIC BLOOD PRESSURE: 93 MMHG | HEIGHT: 64 IN | SYSTOLIC BLOOD PRESSURE: 134 MMHG | HEART RATE: 60 BPM | SYSTOLIC BLOOD PRESSURE: 143 MMHG | DIASTOLIC BLOOD PRESSURE: 51 MMHG | DIASTOLIC BLOOD PRESSURE: 51 MMHG | HEIGHT: 64 IN | RESPIRATION RATE: 16 BRPM | DIASTOLIC BLOOD PRESSURE: 60 MMHG | TEMPERATURE: 97.7 F | OXYGEN SATURATION: 100 % | RESPIRATION RATE: 16 BRPM | WEIGHT: 116 LBS | DIASTOLIC BLOOD PRESSURE: 52 MMHG | SYSTOLIC BLOOD PRESSURE: 134 MMHG | SYSTOLIC BLOOD PRESSURE: 124 MMHG | SYSTOLIC BLOOD PRESSURE: 121 MMHG | HEART RATE: 57 BPM | SYSTOLIC BLOOD PRESSURE: 175 MMHG | WEIGHT: 116 LBS | RESPIRATION RATE: 17 BRPM | HEART RATE: 57 BPM | DIASTOLIC BLOOD PRESSURE: 93 MMHG | OXYGEN SATURATION: 98 % | HEART RATE: 58 BPM | DIASTOLIC BLOOD PRESSURE: 52 MMHG | HEART RATE: 60 BPM | TEMPERATURE: 97.7 F | RESPIRATION RATE: 17 BRPM | HEART RATE: 58 BPM | DIASTOLIC BLOOD PRESSURE: 57 MMHG | DIASTOLIC BLOOD PRESSURE: 57 MMHG | DIASTOLIC BLOOD PRESSURE: 59 MMHG | SYSTOLIC BLOOD PRESSURE: 143 MMHG | TEMPERATURE: 98.3 F | SYSTOLIC BLOOD PRESSURE: 130 MMHG

## 2018-04-05 DIAGNOSIS — K20.9 ESOPHAGITIS, UNSPECIFIED: ICD-10-CM

## 2018-04-05 DIAGNOSIS — K29.50 UNSPECIFIED CHRONIC GASTRITIS WITHOUT BLEEDING: ICD-10-CM

## 2018-04-05 DIAGNOSIS — R93.8 ABNORMAL FINDINGS ON DIAGNOSTIC IMAGING OF OTHER SPECIFIED B: ICD-10-CM

## 2018-04-05 PROBLEM — K29.70 GASTRITIS, UNSPECIFIED, WITHOUT BLEEDING: Status: ACTIVE | Noted: 2018-04-05

## 2018-04-05 PROCEDURE — 88305 TISSUE EXAM BY PATHOLOGIST: CPT

## 2018-04-05 PROCEDURE — G8918 PT W/O PREOP ORDER IV AB PRO: HCPCS

## 2018-04-05 PROCEDURE — 88312 SPECIAL STAINS GROUP 1: CPT

## 2018-04-05 PROCEDURE — 43239 EGD BIOPSY SINGLE/MULTIPLE: CPT

## 2018-04-05 PROCEDURE — 88313 SPECIAL STAINS GROUP 2: CPT

## 2018-04-05 PROCEDURE — 88342 IMHCHEM/IMCYTCHM 1ST ANTB: CPT

## 2018-04-05 PROCEDURE — G8907 PT DOC NO EVENTS ON DISCHARG: HCPCS

## 2018-04-05 RX ADMIN — KETAMINE HYDROCHLORIDE 15 MG: 10 INJECTION, SOLUTION INTRAMUSCULAR; INTRAVENOUS at 07:34

## 2020-01-01 ENCOUNTER — APPOINTMENT (OUTPATIENT)
Dept: CT IMAGING | Facility: HOSPITAL | Age: 85
End: 2020-01-01

## 2020-01-01 ENCOUNTER — HOSPITAL ENCOUNTER (INPATIENT)
Facility: HOSPITAL | Age: 85
LOS: 5 days | Discharge: NURSING, REHAB OR LONG-TERM CARE | End: 2020-01-06
Attending: EMERGENCY MEDICINE | Admitting: INTERNAL MEDICINE

## 2020-01-01 ENCOUNTER — APPOINTMENT (OUTPATIENT)
Dept: GENERAL RADIOLOGY | Facility: HOSPITAL | Age: 85
End: 2020-01-01

## 2020-01-01 DIAGNOSIS — S32.9XXA CLOSED DISPLACED FRACTURE OF PELVIS, UNSPECIFIED PART OF PELVIS, INITIAL ENCOUNTER (HCC): Primary | ICD-10-CM

## 2020-01-01 DIAGNOSIS — Z78.9 IMPAIRED MOBILITY AND ADLS: ICD-10-CM

## 2020-01-01 DIAGNOSIS — N17.9 AKI (ACUTE KIDNEY INJURY) (HCC): ICD-10-CM

## 2020-01-01 DIAGNOSIS — Z74.09 IMPAIRED MOBILITY AND ADLS: ICD-10-CM

## 2020-01-01 DIAGNOSIS — D72.829 LEUKOCYTOSIS, UNSPECIFIED TYPE: ICD-10-CM

## 2020-01-01 DIAGNOSIS — Z74.09 IMPAIRED MOBILITY: ICD-10-CM

## 2020-01-01 PROBLEM — J44.9 COPD (CHRONIC OBSTRUCTIVE PULMONARY DISEASE) (HCC): Status: ACTIVE | Noted: 2020-01-01

## 2020-01-01 PROBLEM — D64.9 ANEMIA: Status: ACTIVE | Noted: 2020-01-01

## 2020-01-01 PROBLEM — J81.0 ACUTE PULMONARY EDEMA (HCC): Status: ACTIVE | Noted: 2020-01-01

## 2020-01-01 PROBLEM — K21.9 GERD (GASTROESOPHAGEAL REFLUX DISEASE): Status: ACTIVE | Noted: 2020-01-01

## 2020-01-01 PROBLEM — I25.10 CORONARY ARTERY DISEASE: Status: ACTIVE | Noted: 2020-01-01

## 2020-01-01 LAB
ABO GROUP BLD: NORMAL
ALBUMIN SERPL-MCNC: 4.3 G/DL (ref 3.5–5.2)
ALBUMIN/GLOB SERPL: 1.5 G/DL
ALP SERPL-CCNC: 40 U/L (ref 39–117)
ALT SERPL W P-5'-P-CCNC: 17 U/L (ref 1–33)
ANION GAP SERPL CALCULATED.3IONS-SCNC: 14 MMOL/L (ref 5–15)
APTT PPP: 29.1 SECONDS (ref 24.1–35)
AST SERPL-CCNC: 29 U/L (ref 1–32)
BASOPHILS # BLD AUTO: 0.09 10*3/MM3 (ref 0–0.2)
BASOPHILS NFR BLD AUTO: 0.5 % (ref 0–1.5)
BILIRUB SERPL-MCNC: 0.2 MG/DL (ref 0.2–1.2)
BLD GP AB SCN SERPL QL: NEGATIVE
BUN BLD-MCNC: 35 MG/DL (ref 8–23)
BUN/CREAT SERPL: 16.4 (ref 7–25)
CALCIUM SPEC-SCNC: 9.3 MG/DL (ref 8.6–10.5)
CHLORIDE SERPL-SCNC: 97 MMOL/L (ref 98–107)
CO2 SERPL-SCNC: 33 MMOL/L (ref 22–29)
CREAT BLD-MCNC: 2.13 MG/DL (ref 0.57–1)
DEPRECATED RDW RBC AUTO: 54.4 FL (ref 37–54)
EOSINOPHIL # BLD AUTO: 0.32 10*3/MM3 (ref 0–0.4)
EOSINOPHIL NFR BLD AUTO: 1.9 % (ref 0.3–6.2)
ERYTHROCYTE [DISTWIDTH] IN BLOOD BY AUTOMATED COUNT: 16.4 % (ref 12.3–15.4)
GFR SERPL CREATININE-BSD FRML MDRD: 22 ML/MIN/1.73
GLOBULIN UR ELPH-MCNC: 2.9 GM/DL
GLUCOSE BLD-MCNC: 101 MG/DL (ref 65–99)
HCT VFR BLD AUTO: 35.3 % (ref 34–46.6)
HGB BLD-MCNC: 10.6 G/DL (ref 12–15.9)
IMM GRANULOCYTES # BLD AUTO: 0.2 10*3/MM3 (ref 0–0.05)
IMM GRANULOCYTES NFR BLD AUTO: 1.2 % (ref 0–0.5)
INR PPP: 0.84 (ref 0.91–1.09)
LYMPHOCYTES # BLD AUTO: 1.17 10*3/MM3 (ref 0.7–3.1)
LYMPHOCYTES NFR BLD AUTO: 6.8 % (ref 19.6–45.3)
MCH RBC QN AUTO: 27.3 PG (ref 26.6–33)
MCHC RBC AUTO-ENTMCNC: 30 G/DL (ref 31.5–35.7)
MCV RBC AUTO: 91 FL (ref 79–97)
MONOCYTES # BLD AUTO: 1.09 10*3/MM3 (ref 0.1–0.9)
MONOCYTES NFR BLD AUTO: 6.3 % (ref 5–12)
NEUTROPHILS # BLD AUTO: 14.4 10*3/MM3 (ref 1.7–7)
NEUTROPHILS NFR BLD AUTO: 83.3 % (ref 42.7–76)
NRBC BLD AUTO-RTO: 0 /100 WBC (ref 0–0.2)
NT-PROBNP SERPL-MCNC: 2628 PG/ML (ref 5–1800)
PLATELET # BLD AUTO: 417 10*3/MM3 (ref 140–450)
PMV BLD AUTO: 11.4 FL (ref 6–12)
POTASSIUM BLD-SCNC: 4 MMOL/L (ref 3.5–5.2)
PROT SERPL-MCNC: 7.2 G/DL (ref 6–8.5)
PROTHROMBIN TIME: 11.8 SECONDS (ref 11.9–14.6)
RBC # BLD AUTO: 3.88 10*6/MM3 (ref 3.77–5.28)
RH BLD: POSITIVE
SODIUM BLD-SCNC: 144 MMOL/L (ref 136–145)
T&S EXPIRATION DATE: NORMAL
TROPONIN T SERPL-MCNC: 0.02 NG/ML (ref 0–0.03)
WBC NRBC COR # BLD: 17.27 10*3/MM3 (ref 3.4–10.8)

## 2020-01-01 PROCEDURE — 94640 AIRWAY INHALATION TREATMENT: CPT

## 2020-01-01 PROCEDURE — 85730 THROMBOPLASTIN TIME PARTIAL: CPT | Performed by: NURSE PRACTITIONER

## 2020-01-01 PROCEDURE — 93005 ELECTROCARDIOGRAM TRACING: CPT | Performed by: NURSE PRACTITIONER

## 2020-01-01 PROCEDURE — 72125 CT NECK SPINE W/O DYE: CPT

## 2020-01-01 PROCEDURE — 84484 ASSAY OF TROPONIN QUANT: CPT | Performed by: NURSE PRACTITIONER

## 2020-01-01 PROCEDURE — 80053 COMPREHEN METABOLIC PANEL: CPT | Performed by: NURSE PRACTITIONER

## 2020-01-01 PROCEDURE — 97161 PT EVAL LOW COMPLEX 20 MIN: CPT

## 2020-01-01 PROCEDURE — 25010000002 ENOXAPARIN PER 10 MG: Performed by: INTERNAL MEDICINE

## 2020-01-01 PROCEDURE — 86900 BLOOD TYPING SEROLOGIC ABO: CPT | Performed by: NURSE PRACTITIONER

## 2020-01-01 PROCEDURE — 25010000002 MORPHINE SULFATE (PF) 2 MG/ML SOLUTION: Performed by: INTERNAL MEDICINE

## 2020-01-01 PROCEDURE — 25010000002 FUROSEMIDE PER 20 MG: Performed by: INTERNAL MEDICINE

## 2020-01-01 PROCEDURE — 97166 OT EVAL MOD COMPLEX 45 MIN: CPT

## 2020-01-01 PROCEDURE — 86850 RBC ANTIBODY SCREEN: CPT | Performed by: NURSE PRACTITIONER

## 2020-01-01 PROCEDURE — 94799 UNLISTED PULMONARY SVC/PX: CPT

## 2020-01-01 PROCEDURE — 73502 X-RAY EXAM HIP UNI 2-3 VIEWS: CPT

## 2020-01-01 PROCEDURE — 25010000002 ONDANSETRON PER 1 MG: Performed by: NURSE PRACTITIONER

## 2020-01-01 PROCEDURE — 83880 ASSAY OF NATRIURETIC PEPTIDE: CPT | Performed by: NURSE PRACTITIONER

## 2020-01-01 PROCEDURE — 85025 COMPLETE CBC W/AUTO DIFF WBC: CPT | Performed by: NURSE PRACTITIONER

## 2020-01-01 PROCEDURE — 72128 CT CHEST SPINE W/O DYE: CPT

## 2020-01-01 PROCEDURE — 85610 PROTHROMBIN TIME: CPT | Performed by: NURSE PRACTITIONER

## 2020-01-01 PROCEDURE — 86901 BLOOD TYPING SEROLOGIC RH(D): CPT | Performed by: NURSE PRACTITIONER

## 2020-01-01 PROCEDURE — 70450 CT HEAD/BRAIN W/O DYE: CPT

## 2020-01-01 PROCEDURE — 71045 X-RAY EXAM CHEST 1 VIEW: CPT

## 2020-01-01 PROCEDURE — 99284 EMERGENCY DEPT VISIT MOD MDM: CPT

## 2020-01-01 PROCEDURE — 25010000002 MORPHINE SULFATE (PF) 2 MG/ML SOLUTION: Performed by: NURSE PRACTITIONER

## 2020-01-01 PROCEDURE — 93010 ELECTROCARDIOGRAM REPORT: CPT | Performed by: INTERNAL MEDICINE

## 2020-01-01 RX ORDER — MORPHINE SULFATE 2 MG/ML
2 INJECTION, SOLUTION INTRAMUSCULAR; INTRAVENOUS EVERY 4 HOURS PRN
Status: DISCONTINUED | OUTPATIENT
Start: 2020-01-01 | End: 2020-01-02

## 2020-01-01 RX ORDER — DIPHENHYDRAMINE HCL 25 MG
25 CAPSULE ORAL EVERY 6 HOURS PRN
Status: DISCONTINUED | OUTPATIENT
Start: 2020-01-01 | End: 2020-01-06 | Stop reason: HOSPADM

## 2020-01-01 RX ORDER — ATORVASTATIN CALCIUM 40 MG/1
80 TABLET, FILM COATED ORAL DAILY
Status: DISCONTINUED | OUTPATIENT
Start: 2020-01-01 | End: 2020-01-06 | Stop reason: HOSPADM

## 2020-01-01 RX ORDER — MORPHINE SULFATE 2 MG/ML
2 INJECTION, SOLUTION INTRAMUSCULAR; INTRAVENOUS ONCE
Status: COMPLETED | OUTPATIENT
Start: 2020-01-01 | End: 2020-01-01

## 2020-01-01 RX ORDER — PANTOPRAZOLE SODIUM 40 MG/1
40 TABLET, DELAYED RELEASE ORAL 2 TIMES DAILY
Status: DISCONTINUED | OUTPATIENT
Start: 2020-01-01 | End: 2020-01-06 | Stop reason: HOSPADM

## 2020-01-01 RX ORDER — ONDANSETRON 2 MG/ML
4 INJECTION INTRAMUSCULAR; INTRAVENOUS ONCE
Status: COMPLETED | OUTPATIENT
Start: 2020-01-01 | End: 2020-01-01

## 2020-01-01 RX ORDER — FUROSEMIDE 10 MG/ML
40 INJECTION INTRAMUSCULAR; INTRAVENOUS ONCE
Status: COMPLETED | OUTPATIENT
Start: 2020-01-01 | End: 2020-01-01

## 2020-01-01 RX ORDER — LIDOCAINE 50 MG/G
1 PATCH TOPICAL
Status: DISCONTINUED | OUTPATIENT
Start: 2020-01-01 | End: 2020-01-06 | Stop reason: HOSPADM

## 2020-01-01 RX ORDER — SENNOSIDES 8.6 MG
1 TABLET ORAL DAILY PRN
Status: DISCONTINUED | OUTPATIENT
Start: 2020-01-01 | End: 2020-01-02

## 2020-01-01 RX ORDER — OXYCODONE AND ACETAMINOPHEN 10; 325 MG/1; MG/1
1 TABLET ORAL EVERY 6 HOURS PRN
Status: DISCONTINUED | OUTPATIENT
Start: 2020-01-01 | End: 2020-01-02

## 2020-01-01 RX ORDER — ONDANSETRON 2 MG/ML
4 INJECTION INTRAMUSCULAR; INTRAVENOUS EVERY 6 HOURS PRN
Status: DISCONTINUED | OUTPATIENT
Start: 2020-01-01 | End: 2020-01-06 | Stop reason: HOSPADM

## 2020-01-01 RX ORDER — ATENOLOL 25 MG/1
25 TABLET ORAL DAILY
Status: DISCONTINUED | OUTPATIENT
Start: 2020-01-01 | End: 2020-01-06 | Stop reason: HOSPADM

## 2020-01-01 RX ORDER — SODIUM CHLORIDE 0.9 % (FLUSH) 0.9 %
10 SYRINGE (ML) INJECTION EVERY 12 HOURS SCHEDULED
Status: DISCONTINUED | OUTPATIENT
Start: 2020-01-01 | End: 2020-01-06 | Stop reason: HOSPADM

## 2020-01-01 RX ORDER — SODIUM CHLORIDE 0.9 % (FLUSH) 0.9 %
10 SYRINGE (ML) INJECTION AS NEEDED
Status: DISCONTINUED | OUTPATIENT
Start: 2020-01-01 | End: 2020-01-06 | Stop reason: HOSPADM

## 2020-01-01 RX ORDER — BUDESONIDE AND FORMOTEROL FUMARATE DIHYDRATE 160; 4.5 UG/1; UG/1
2 AEROSOL RESPIRATORY (INHALATION)
Status: DISCONTINUED | OUTPATIENT
Start: 2020-01-01 | End: 2020-01-06 | Stop reason: HOSPADM

## 2020-01-01 RX ORDER — SODIUM CHLORIDE 9 MG/ML
75 INJECTION, SOLUTION INTRAVENOUS CONTINUOUS
Status: DISCONTINUED | OUTPATIENT
Start: 2020-01-01 | End: 2020-01-01

## 2020-01-01 RX ORDER — GABAPENTIN 300 MG/1
600 CAPSULE ORAL NIGHTLY
Status: DISCONTINUED | OUTPATIENT
Start: 2020-01-01 | End: 2020-01-06 | Stop reason: HOSPADM

## 2020-01-01 RX ORDER — KETOTIFEN FUMARATE 0.35 MG/ML
1 SOLUTION/ DROPS OPHTHALMIC DAILY PRN
Status: DISCONTINUED | OUTPATIENT
Start: 2020-01-01 | End: 2020-01-06 | Stop reason: HOSPADM

## 2020-01-01 RX ORDER — IPRATROPIUM BROMIDE AND ALBUTEROL SULFATE 2.5; .5 MG/3ML; MG/3ML
3 SOLUTION RESPIRATORY (INHALATION) EVERY 4 HOURS PRN
Status: DISCONTINUED | OUTPATIENT
Start: 2020-01-01 | End: 2020-01-06 | Stop reason: HOSPADM

## 2020-01-01 RX ADMIN — BUDESONIDE AND FORMOTEROL FUMARATE DIHYDRATE 2 PUFF: 160; 4.5 AEROSOL RESPIRATORY (INHALATION) at 09:46

## 2020-01-01 RX ADMIN — ATENOLOL 25 MG: 25 TABLET ORAL at 10:48

## 2020-01-01 RX ADMIN — FUROSEMIDE 40 MG: 10 INJECTION, SOLUTION INTRAMUSCULAR; INTRAVENOUS at 18:08

## 2020-01-01 RX ADMIN — SERTRALINE 150 MG: 100 TABLET, FILM COATED ORAL at 10:48

## 2020-01-01 RX ADMIN — OXYCODONE HYDROCHLORIDE AND ACETAMINOPHEN 1 TABLET: 10; 325 TABLET ORAL at 23:50

## 2020-01-01 RX ADMIN — BUDESONIDE AND FORMOTEROL FUMARATE DIHYDRATE 2 PUFF: 160; 4.5 AEROSOL RESPIRATORY (INHALATION) at 20:45

## 2020-01-01 RX ADMIN — ISOSORBIDE MONONITRATE 90 MG: 30 TABLET, EXTENDED RELEASE ORAL at 10:48

## 2020-01-01 RX ADMIN — MORPHINE SULFATE 2 MG: 2 INJECTION, SOLUTION INTRAMUSCULAR; INTRAVENOUS at 02:39

## 2020-01-01 RX ADMIN — OXYCODONE HYDROCHLORIDE AND ACETAMINOPHEN 1 TABLET: 10; 325 TABLET ORAL at 11:56

## 2020-01-01 RX ADMIN — IPRATROPIUM BROMIDE AND ALBUTEROL SULFATE 3 ML: 2.5; .5 SOLUTION RESPIRATORY (INHALATION) at 13:25

## 2020-01-01 RX ADMIN — LIDOCAINE 1 PATCH: 50 PATCH CUTANEOUS at 17:46

## 2020-01-01 RX ADMIN — MORPHINE SULFATE 2 MG: 2 INJECTION, SOLUTION INTRAMUSCULAR; INTRAVENOUS at 20:01

## 2020-01-01 RX ADMIN — ONDANSETRON HYDROCHLORIDE 4 MG: 2 SOLUTION INTRAMUSCULAR; INTRAVENOUS at 01:52

## 2020-01-01 RX ADMIN — GABAPENTIN 600 MG: 300 CAPSULE ORAL at 20:58

## 2020-01-01 RX ADMIN — SODIUM CHLORIDE, PRESERVATIVE FREE 10 ML: 5 INJECTION INTRAVENOUS at 10:47

## 2020-01-01 RX ADMIN — OXYCODONE HYDROCHLORIDE AND ACETAMINOPHEN 1 TABLET: 10; 325 TABLET ORAL at 17:44

## 2020-01-01 RX ADMIN — SODIUM CHLORIDE, PRESERVATIVE FREE 10 ML: 5 INJECTION INTRAVENOUS at 20:59

## 2020-01-01 RX ADMIN — MORPHINE SULFATE 2 MG: 2 INJECTION, SOLUTION INTRAMUSCULAR; INTRAVENOUS at 01:52

## 2020-01-01 RX ADMIN — ENOXAPARIN SODIUM 30 MG: 30 INJECTION SUBCUTANEOUS at 20:58

## 2020-01-01 RX ADMIN — ATORVASTATIN CALCIUM 80 MG: 40 TABLET, FILM COATED ORAL at 10:48

## 2020-01-01 RX ADMIN — PANTOPRAZOLE SODIUM 40 MG: 40 TABLET, DELAYED RELEASE ORAL at 20:59

## 2020-01-01 RX ADMIN — PANTOPRAZOLE SODIUM 40 MG: 40 TABLET, DELAYED RELEASE ORAL at 10:50

## 2020-01-01 RX ADMIN — SODIUM CHLORIDE 75 ML/HR: 9 INJECTION, SOLUTION INTRAVENOUS at 10:46

## 2020-01-01 NOTE — THERAPY EVALUATION
Patient Name: Roberta Persaud  : 1931    MRN: [id number]                              Today's Date: 2020       Admit Date: 2020    Visit Dx:     ICD-10-CM ICD-9-CM   1. Closed displaced fracture of pelvis, unspecified part of pelvis, initial encounter (CMS/HCC) S32.9XXA 808.8   2. KASANDRA (acute kidney injury) (CMS/HCC) N17.9 584.9   3. Leukocytosis, unspecified type D72.829 288.60   4. Impaired mobility Z74.09 799.89     Patient Active Problem List   Diagnosis   • Fever in adult   • Altered mental status   • Closed displaced fracture of pelvis (CMS/HCC)     Past Medical History:   Diagnosis Date   • Arthritis    • COPD (chronic obstructive pulmonary disease) (CMS/HCC)    • Coronary artery disease    • GERD (gastroesophageal reflux disease)      Past Surgical History:   Procedure Laterality Date   • CORONARY ARTERY BYPASS GRAFT       General Information     Row Name 20 1300          PT Evaluation Time/Intention    Document Type  evaluation s/p fall off toliet, Dx:  R pelvic fxs  -     Mode of Treatment  physical therapy  -     Row Name 20 1300          General Information    Patient Profile Reviewed?  yes non op treatement per ortho  -     Prior Level of Function  independent:;all household mobility;community mobility;ADL's;dependent:;driving uses RW, O2 at home  -     Existing Precautions/Restrictions  fall;oxygen therapy device and L/min TTWB RLE  -     Barriers to Rehab  none identified  -     Row Name 20 1300          Relationship/Environment    Lives With  alone son and brother check in daily on pt  -     Row Name 20 1300          Resource/Environmental Concerns    Current Living Arrangements  home/apartment/condo tub/shower w/ grab bars and bath bench  -     Row Name 20 1300          Home Main Entrance    Number of Stairs, Main Entrance  -- ramp  -     Row Name 20 1300          Cognitive Assessment/Intervention- PT/OT    Orientation  Status (Cognition)  oriented x 4  -     Row Name 01/01/20 1300          Safety Issues, Functional Mobility    Safety Issues Affecting Function (Mobility)  ability to follow commands  -     Impairments Affecting Function (Mobility)  balance;endurance/activity tolerance;pain;strength  -       User Key  (r) = Recorded By, (t) = Taken By, (c) = Cosigned By    Initials Name Provider Type     David Phipps, PT Physical Therapist        Mobility     Row Name 01/01/20 1300          Bed Mobility Assessment/Treatment    Bed Mobility Assessment/Treatment  supine-sit;sit-supine;scooting/bridging  -     Scooting/Bridging [place] (Bed Mobility)  verbal cues;moderate assist (50% patient effort);2 person assist  -     Supine-Sit [place] (Bed Mobility)  verbal cues;moderate assist (50% patient effort);2 person assist  -     Sit-Supine [place] (Bed Mobility)  verbal cues;moderate assist (50% patient effort);2 person assist  -     Assistive Device (Bed Mobility)  draw sheet;head of bed elevated  -     Row Name 01/01/20 1300          Sit-Stand Transfer    Sit-Stand [place] (Transfers)  verbal cues;moderate assist (50% patient effort);2 person assist stood bedside w/ RW TTWB RLE  -     Row Name 01/01/20 1300          Gait/Stairs Assessment/Training    [place] Level (Gait)  unable to assess  -     Row Name 01/01/20 1300          Mobility Assessment/Intervention    Extremity Weight-bearing Status  right lower extremity  -     Right Lower Extremity (Weight-bearing Status)  (S) toe touch weight-bearing (TTWB)  -       User Key  (r) = Recorded By, (t) = Taken By, (c) = Cosigned By    Initials Name Provider Type     David Phipps, PT Physical Therapist        Obj/Interventions     Row Name 01/01/20 1300          General ROM    GENERAL ROM COMMENTS  WFL  -     Row Name 01/01/20 1300          MMT (Manual Muscle Testing)    General MMT Comments  functionally 4-/5  -     Row Name 01/01/20  1300          Static Sitting Balance    Level of [place] (Unsupported Sitting, Static Balance)  standby assist  -     Sitting Position (Unsupported Sitting, Static Balance)  sitting on edge of bed  -[facility] Name 01/01/20 1300          Static Standing Balance    Level of [place] (Supported Standing, Static Balance)  minimal assist, 75% patient effort  -     Assistive Device Utilized (Supported Standing, Static Balance)  walker, rolling  -     Row Name 01/01/20 1300          Sensory Assessment/Intervention    Sensory General Assessment  no sensation deficits identified  -       User Key  (r) = Recorded By, (t) = Taken By, (c) = Cosigned By    Initials Name Provider Type     David Phipps, PT Physical Therapist        Goals/Plan     [facility] Name 01/01/20 1300          Bed Mobility Goal 1 (PT)    Activity/Assistive Device (Bed Mobility Goal 1, PT)  bed mobility activities, all  -     [place] Level/Cues Needed (Bed Mobility Goal 1, PT)  standby assist  -     Time Frame (Bed Mobility Goal 1, PT)  by discharge  -     Progress/Outcomes (Bed Mobility Goal 1, PT)  goal ongoing  -[facility] Name 01/01/20 1300          Transfer Goal 1 (PT)    Activity/Assistive Device (Transfer Goal 1, PT)  sit-to-stand/stand-to-sit;bed-to-chair/chair-to-bed;walker, rolling;car transfer  -     [place] Level/Cues Needed (Transfer Goal 1, PT)  standby assist  -     Time Frame (Transfer Goal 1, PT)  by discharge  -     Barriers (Transfers Goal 1, PT)  TTWB RLE  -     Progress/Outcome (Transfer Goal 1, PT)  goal ongoing  -       User Key  (r) = Recorded By, (t) = Taken By, (c) = Cosigned By    Initials Name Provider Type     David Phipps, PT Physical Therapist        Clinical Impression     Row Name 01/01/20 1300          Pain Assessment    Additional Documentation  Pain Scale: Numbers Pre/Post-Treatment (Group)  -[facility] Name 01/01/20 1300          Pain Scale: Numbers Pre/Post-Treatment    Pain  Scale: Numbers, Pretreatment  9/10  -     Pain Scale: Numbers, Post-Treatment  9/10  -LH     Pain Location - Side  Right  -LH     Pain Location  hip  -LH     Pain Intervention(s)  Medication (See MAR);Repositioned;Ambulation/increased activity  -     Row Name 01/01/20 1300          Plan of Care Review    Plan of Care Reviewed With  patient;son  -     Outcome Summary  PT IE complete.  Mod assist x2 for bed mobility and standing bedside.  TTWB RLE w/ RW.  9/10 pain R side of pelvis.  91% w/ 2.5L O2 per NC.  Recommend home health vs. transitional care at [place].  Thank you for referral.  -     Row Name 01/01/20 1300          Physical Therapy Clinical Impression    Patient/Family Goals Statement (PT Clinical Impression)  return home  -     Criteria for Skilled Interventions Met (PT Clinical Impression)  yes;treatment indicated  -     Rehab Potential (PT Clinical Summary)  good, to achieve stated therapy goals  -     Predicted Duration of Therapy (PT)  until dc  -     Row Name 01/01/20 1300          Vital Signs    Intra SpO2 (%)  91  -LH     O2 Delivery Intra Treatment  supplemental O2  -     Intra Patient Position  Sitting  -     Row Name 01/01/20 1300          Positioning and Restraints    Pre-Treatment Position  in bed  -     Post Treatment Position  bed  -LH     In Bed  fowlers;call light within reach;encouraged to call for assist;exit alarm on;with family/caregiver;side rails up x2  -       User Key  (r) = Recorded By, (t) = Taken By, (c) = Cosigned By    Initials Name Provider Type     David Phipps, PT Physical Therapist        Outcome Measures     Row Name 01/01/20 1354          How much help from another person do you currently need...    Turning from your back to your side while in flat bed without using bedrails?  2  -LH     Moving from lying on back to sitting on the side of a flat bed without bedrails?  2  -LH     Moving to and from a bed to a chair (including a wheelchair)?  2  -LH      Standing up from a chair using your arms (e.g., wheelchair, bedside chair)?  2  -     Climbing 3-5 steps with a railing?  1  -     To walk in hospital room?  1  -     AM-PAC 6 Clicks Score (PT)  10  -     Row Name 01/01/20 1354          Functional Assessment    Outcome Measure Options  AM-PAC 6 Clicks Basic Mobility (PT)  -       User Key  (r) = Recorded By, (t) = Taken By, (c) = Cosigned By    Initials Name Provider Type     David Phipps, PT Physical Therapist          PT Recommendation and Plan  Planned Therapy Interventions (PT Eval): bed mobility training, home exercise program, patient/family education, strengthening, transfer training  Outcome Summary/Treatment Plan (PT)  Anticipated Discharge Disposition (PT): home with home health, transitional care  Plan of Care Reviewed With: patient, son  Outcome Summary: PT IE complete.  Mod assist x2 for bed mobility and standing bedside.  TTWB RLE w/ RW.  9/10 pain R side of pelvis.  91% w/ 2.5L O2 per NC.  Recommend home health vs. transitional care at [place].  Thank you for referral.     Time Calculation:   PT Charges     Row Name 01/01/20 8666             Time Calculation    Start Time  1300 10 min this am reviewing chart  -      Stop Time  1345  -      Time Calculation (min)  45 min  -      PT Received On  01/01/20  -      PT Goal Re-Cert Due Date  01/11/20  -        User Key  (r) = Recorded By, (t) = Taken By, (c) = Cosigned By    Initials Name Provider Type     David Phipps, PT Physical Therapist        Therapy Charges for Today     Code Description Service Date Service Provider Modifiers Qty    53459603307 HC PT EVAL LOW COMPLEXITY 4 1/1/2020 David Phipps, PT GP 1          PT G-Codes  Outcome Measure Options: AM-PAC 6 Clicks Basic Mobility (PT)  AM-PAC 6 Clicks Score (PT): 10    David Phipps PT  1/1/2020

## 2020-01-01 NOTE — PROGRESS NOTES
[facility]  INPATIENT PROGRESS NOTE    Patient Name: Roberta Persaud  Date of Admission: 1/1/2020  Today's Date: 01/01/20  Length of Stay: 0  Primary Care Physician: Provider, No Known    Subjective   Chief Complaint: pelvic pain   HPI     Patient seen and examined at bedside. Patient has worsening shortness of breath, started PRN duonebs for her and gave a dose of lasix as her CXR looks volume overloaded per my interpretation.  Patient complains of 10/10 pain and indicates the PO medication is not working.         Review of Systems   Constitutional: Positive for activity change and fatigue. Negative for appetite change, chills and fever.   Respiratory: Positive for shortness of breath. Negative for cough.    Cardiovascular: Negative for chest pain and palpitations.   Gastrointestinal: Negative for abdominal distention, abdominal pain, diarrhea, nausea and vomiting.   Musculoskeletal: Positive for back pain.        Pelvic pain   Neurological: Positive for weakness.        All pertinent negatives and positives are as above. All other systems have been reviewed and are negative unless otherwise stated.     Objective    Temp:  [97.5 °F (36.4 °C)-98.4 °F (36.9 °C)] 97.5 °F (36.4 °C)  Heart Rate:  [70-91] 91  Resp:  [16-18] 18  BP: (105-139)/(43-80) 110/48  Physical Exam   Constitutional: She is oriented to person, place, and time. No distress.   Appears uncomfortable and in pain    HENT:   Head: Normocephalic and atraumatic.   Eyes: Conjunctivae are normal. No scleral icterus.   Neck: Neck supple. No JVD present.   Cardiovascular: Normal rate and regular rhythm.   Murmur heard.  Pulmonary/Chest: Effort normal. She has no wheezes. She has rales.   Abdominal: Soft. Bowel sounds are normal. She exhibits no distension and no mass. There is no tenderness. There is no guarding.   Musculoskeletal: She exhibits no edema.   Neurological: She is alert and oriented to  person, place, and time.   Skin: Skin is warm and dry. She is not diaphoretic. No erythema.   Psychiatric: She has a normal mood and affect. Her behavior is normal.   Nursing note and vitals reviewed.          Results Review:  I have reviewed the labs, radiology results, and diagnostic studies.    Laboratory Data:   Results from last 7 days   Lab Units 01/01/20  0151   WBC 10*3/mm3 17.27*   HEMOGLOBIN g/dL 10.6*   HEMATOCRIT % 35.3   PLATELETS 10*3/mm3 417        Results from last 7 days   Lab Units 01/01/20  0151   SODIUM mmol/L 144   POTASSIUM mmol/L 4.0   CHLORIDE mmol/L 97*   CO2 mmol/L 33.0*   BUN mg/dL 35*   CREATININE mg/dL 2.13*   CALCIUM mg/dL 9.3   BILIRUBIN mg/dL 0.2   ALK PHOS U/L 40   ALT (SGPT) U/L 17   AST (SGOT) U/L 29   GLUCOSE mg/dL 101*       Culture Data:   No results found for: BLOODCX, URINECX, WOUNDCX, MRSACX, RESPCX, STOOLCX    Radiology Data:   Imaging Results (Last 24 Hours)     Procedure Component Value Units Date/Time    XR Chest 1 View [[id number]] Collected:  01/01/20 0749     Updated:  01/01/20 0753    Narrative:       EXAMINATION:  XR CHEST 1 VW-  1/1/2020 2:10 AM CST     HISTORY: Chest pain.     COMPARISON: 03/24/2017.     FINDINGS:  There is cardiomegaly with vascular redistribution. There are  interstitial infiltrates. There is chronic bronchial wall thickening.  There has been prior median sternotomy.       Impression:       1. Cardiomegaly with vascular redistribution.  2. Interstitial infiltrates, likely edema.  3. Stable chronic bronchial wall thickening.        This report was finalized on 01/01/2020 07:50 by Dr. Rodrick Byrne MD.    XR Hip With or Without Pelvis 2 - 3 View Right [[id number]] Collected:  01/01/20 0747     Updated:  01/01/20 0752    Narrative:       EXAMINATION:  XR HIP W OR WO PELVIS 2-3 VIEW RIGHT-  1/1/2020 2:10 AM  CST     HISTORY: The patient fell. Right hip pain.     COMPARISON: No comparison study.     TECHNIQUE: AP and crosstable lateral views of the right  hip were  supplemented with an AP image of the pelvis.     FINDINGS: There is a comminuted acute fracture of the superior pubic  ramus on the right. There is also an inferior pubic ramus fracture on  the right. There has been prior internal repair of a right proximal  femur fracture that appears to be well healed.       Impression:       1. Acute fractures of the superior and inferior pubic rami on the right  side.  2. Chronic proximal right femur fracture with internal repair.  This report was finalized on 01/01/2020 07:49 by Dr. Rodrick Byrne MD.    CT Thoracic Spine Without Contrast [[id number]] Collected:  01/01/20 0734     Updated:  01/01/20 0744    Narrative:       EXAMINATION:  CT THORACIC SPINE WO CONTRAST-  1/1/2020 2:56 AM CST     HISTORY: The patient fell. Mid back pain.     TECHNIQUE: Spiral CT was performed of the thoracic spine. Sagittal and  coronal images were reconstructed.     DLP: 850 mGy-cm. Automated dosage control was utilized.     COMPARISON: No comparison study.     FINDINGS: There is atheromatous disease of the thoracic aorta and  coronary arteries. The pulmonary arteries are dilated. There are  compression fractures at T4, T6 and T9. The fractures at T4 and T6 are  probably chronic. There has been prior kyphoplasty at T9. There is  chronic compression deformity of L2 that is only partially included on  this study. There has been kyphoplasty at L2. No acute appearing  fracture is identified. There is disc degeneration at multiple thoracic  levels. There are degenerative changes in the visualized cervical spine  most severe at C4-5 and C5-6. Please see the cervical spine CT report  separately.       Impression:       1. Thoracic spine compression deformities, as described.  2. Degenerative disc disease at multiple levels.  3. Atheromatous disease of the thoracic aorta and coronary arteries.  Dilated pulmonary arteries.  This report was finalized on 01/01/2020 07:41 by Dr. Rodrick Byrne MD.     CT Cervical Spine Without Contrast [[id number]] Collected:  01/01/20 0729     Updated:  01/01/20 0737    Narrative:       EXAMINATION:  CT CERVICAL SPINE WO CONTRAST-  1/1/2020 2:56 AM CST     HISTORY: The patient fell. Neck pain.     TECHNIQUE: Spiral CT was performed of the cervical spine. Sagittal and  coronal images were reconstructed.     DLP: 202 mGy-cm. Automated dosage control was utilized.     COMPARISON: No comparison study.     FINDINGS: There is no evidence of cervical spine fracture. There is  severe narrowing of the space between the anterior arch of C1 and the  dens. There is an os odontoideum. There is calcification of the carotid  arteries in the neck bilaterally. The bones are demineralized.     At C2-3, the disc is fairly well-maintained. There is facet arthropathy.  There is no spinal or foraminal narrowing.     At C3-4, there is disc narrowing with spondylitic and uncinate spurring.  There is facet arthropathy and minimal anterior subluxation of C3  compared to C4. There is mild narrowing of the central spinal canal.  There is mild to moderate bilateral foraminal narrowing.     At C4-5, there is disc narrowing with spondylitic and uncinate spurring  producing dural sac compression. There is mild narrowing of the central  canal. There is facet arthropathy on the right. There is severe right  and mild left-sided foraminal stenosis.     At C5-6, there is disc narrowing with spondylitic and uncinate spurring.  There is facet arthropathy. There is mild to moderate bilateral  foraminal stenosis. There is mild narrowing of the central canal.     At C6-7, there is disc narrowing with spondylitic and uncinate spurring.  There is mild narrowing of the central spinal canal. There is mild to  moderate bilateral foraminal stenosis.       Impression:       1. No evidence of cervical spine fracture.  2. Degenerative changes, as described.     This report was finalized on 01/01/2020 07:34 by Dr. Rodrick Byrne,  MD.    CT Head Without Contrast [[id number]] Collected:  01/01/20 0723     Updated:  01/01/20 0728    Narrative:       EXAMINATION:  CT HEAD WO CONTRAST-  1/1/2020 2:56 AM CST     HISTORY: The patient fell. Head injury.     TECHNIQUE: Multiple axial images were obtained through the brain without  contrast infusion. Multiplanar images were reconstructed.     DLP: 551 mGy-cm. Automated dosage control was utilized.     COMPARISON: 03/24/2017.     FINDINGS: There are no hemorrhage, edema or mass effect. There is mild  to moderate atrophy with associated ventricular prominence. There is low  density in the hemispheric white matter. There is bilateral basal  ganglia and cerebellar calcification. Vascular calcification is noted.  The visualized paranasal sinuses and mastoid air cells are clear.       Impression:       1. No acute hemorrhage, edema or mass effect.  2. Atrophy and chronic ischemic white matter disease, stable.     This report was finalized on 01/01/2020 07:25 by Dr. Rodrick Byrne MD.          I have reviewed the patient's current medications.     Assessment/Plan     Active Hospital Problems    Diagnosis   • Closed displaced fracture of pelvis (CMS/HCC)   • GERD (gastroesophageal reflux disease)   • Coronary artery disease   • Acute pulmonary edema (CMS/HCC)   • COPD (chronic obstructive pulmonary disease) (CMS/HCC)   • Acute kidney injury (CMS/HCC)   • Anemia       Plan:  1.  Start xarelto 10 mg for DVT PPx, patient has a history of blood clot in leg after cardiac surgery  2.  PT/OT  3.  Duonebs added   4.  Morphine 2 mg IV PRN and PRN percocet   5.  Furosemide 40 mg IV x 1   6.  CXR personally reviewed and shows pulmonary edema   7.  Monitor fluid status closely  8.  May need placement    9.  AM CBC and BMP       Case discussed with bedside RN       Weight-bearing status TTWB               Discharge Planning: I expect the patient to be discharged to ? in ? days.    Sami Griffin MD   01/01/20   3:33  PM

## 2020-01-01 NOTE — THERAPY EVALUATION
Acute Care - Occupational Therapy Initial Evaluation  [facility]     Patient Name: Roberta Persaud  : 1931  MRN: [id number]  Today's Date: 2020  Onset of Illness/Injury or Date of Surgery: 19  Date of Referral to OT: 20  Referring Physician: Dr. Alvarado    Admit Date: 2020       ICD-10-CM ICD-9-CM   1. Closed displaced fracture of pelvis, unspecified part of pelvis, initial encounter (CMS/HCC) S32.9XXA 808.8   2. KASANDRA (acute kidney injury) (CMS/HCC) N17.9 584.9   3. Leukocytosis, unspecified type D72.829 288.60   4. Impaired mobility Z74.09 799.89   5. Impaired mobility and ADLs Z74.09 799.89     Patient Active Problem List   Diagnosis   • Fever in adult   • Altered mental status   • Closed displaced fracture of pelvis (CMS/[facility])     Past Medical History:   Diagnosis Date   • Arthritis    • COPD (chronic obstructive pulmonary disease) (CMS/[facility])    • Coronary artery disease    • GERD (gastroesophageal reflux disease)      Past Surgical History:   Procedure Laterality Date   • CORONARY ARTERY BYPASS GRAFT            OT ASSESSMENT FLOWSHEET (last 12 hours)      Occupational Therapy Evaluation     Row Name 20 1315                   OT Evaluation Time/Intention    Subjective Information  complains of;pain  -MW        Document Type  evaluation  -MW        Mode of Treatment  occupational therapy  -MW           General Information    Patient Profile Reviewed?  yes  -MW        Onset of Illness/Injury or Date of Surgery  19  -MW        Referring Physician  Dr. Alvarado  -MW        Patient Observations  alert;cooperative;agree to therapy  -MW        Patient/Family Observations  son present  -MW        General Observations of Patient  asleep but arouses to name, 2.5L O2 nc  -MW        Prior Level of Function  independent:;all household mobility;community mobility;ADL's;dependent:;driving  -MW        Equipment Currently Used at Home  bath bench;rollator;wheelchair;oxygen;ramp 2.5L O2 at  all times  -MW        Pertinent History of Current Functional Problem  s/p fall off toliet, Dx:  R pelvic fxs  -MW        Existing Precautions/Restrictions  fall;oxygen therapy device and L/min TTWB RLE  -MW           Relationship/Environment    Primary Source of Support/Comfort  child(corina)  -MW        Lives With  alone son and brother check in daily  -MW           Resource/Environmental Concerns    Current Living Arrangements  home/apartment/condo  -MW           Cognitive Assessment/Interventions    Additional Documentation  Cognitive Assessment/Intervention (Group)  -MW           Cognitive Assessment/Intervention- PT/OT    Affect/Mental Status (Cognitive)  WFL  -MW        Orientation Status (Cognition)  oriented x 4  -MW        Follows Commands (Cognition)  WFL  -MW        Cognitive Function (Cognitive)  WFL  -MW        Personal Safety Interventions  fall prevention program maintained;gait belt;muscle strengthening facilitated;nonskid shoes/slippers when out of bed;supervised activity  -MW           Safety Issues, Functional Mobility    Impairments Affecting Function (Mobility)  balance;endurance/activity tolerance;pain;strength  -MW           Mobility Assessment/Treatment    Extremity Weight-bearing Status  right lower extremity  -MW        Right Lower Extremity (Weight-bearing Status)  (S) toe touch weight-bearing (TTWB) with ability to auto protect PRN  -MW           Bed Mobility Assessment/Treatment    Bed Mobility Assessment/Treatment  supine-sit;sit-supine;scooting/bridging  -MW        Scooting/Bridging [place] (Bed Mobility)  verbal cues;moderate assist (50% patient effort);2 person assist  -MW        Supine-Sit [place] (Bed Mobility)  verbal cues;moderate assist (50% patient effort);2 person assist  -MW        Sit-Supine [place] (Bed Mobility)  verbal cues;moderate assist (50% patient effort);2 person assist  -MW        Assistive Device (Bed Mobility)  draw sheet;head of bed elevated  -MW            Functional Mobility    Functional Mobility- Comment  unable to perform, unable to maintain TTWB and limited by pain  -MW           Transfer Assessment/Treatment    Transfer Assessment/Treatment  sit-stand transfer  -MW           Sit-Stand Transfer    Sit-Stand [place] (Transfers)  verbal cues;moderate assist (50% patient effort);2 person assist  -MW           ADL Assessment/Intervention    BADL Assessment/Intervention  lower body dressing  -MW           Lower Body Dressing Assessment/Training    Lower Body Dressing [place] Level  don;socks;dependent (less than 25% patient effort)  -MW        Lower Body Dressing Position  edge of bed sitting  -MW           BADL Safety/Performance    Impairments, BADL Safety/Performance  balance;endurance/activity tolerance;pain;strength  -MW           General ROM    GENERAL ROM COMMENTS  AROM WFL BUE  -MW           MMT (Manual Muscle Testing)    General MMT Comments  BUE functionally 4-/5  -MW           Motor Assessment/Interventions    Additional Documentation  Balance (Group)  -MW           Balance    Balance  static sitting balance;static standing balance  -           Static Sitting Balance    Level of [place] (Unsupported Sitting, Static Balance)  standby assist  -MW        Sitting Position (Unsupported Sitting, Static Balance)  sitting on edge of bed  -           Static Standing Balance    Level of [place] (Supported Standing, Static Balance)  minimal assist, 75% patient effort  -MW        Assistive Device Utilized (Supported Standing, Static Balance)  walker, rolling  -MW           Sensory Assessment/Intervention    Sensory General Assessment  no sensation deficits identified  -MW           Positioning and Restraints    Pre-Treatment Position  in bed  -MW        Post Treatment Position  bed  -MW        In Bed  fowlers;call light within reach;encouraged to call for assist;exit alarm on;side rails up x2;with family/caregiver  -MW           Pain  Assessment    Additional Documentation  Pain Scale: Numbers Pre/Post-Treatment (Group)  -MW           Pain Scale: Numbers Pre/Post-Treatment    Pain Scale: Numbers, Pretreatment  9/10  -MW        Pain Scale: Numbers, Post-Treatment  9/10  -MW        Pain Location - Side  Right  -MW        Pain Location  hip  -MW        Pain Intervention(s)  Medication (See MAR);Repositioned;Ambulation/increased activity  -MW           Plan of Care Review    Plan of Care Reviewed With  patient;son  -MW        Outcome Summary  OT eval completed. Pt significantly limited by pain, rates 9/10 throughout eval. ModAx2 for all bed mobility and to come to stand. Unable to maintain TTWB to take steps at this time. Max-dep LB dressing and hygiene following incontinence. Pt reports she has been unable to control bladder when coughing and has increased tremors in RUE since injury. OT indicated to address ADL, transfer, and functional mobility to increase independence and safety. Pt and family plan for her to return home at d/c, recommend HH vs transitional care for continued rehab.   -MW           Clinical Impression (OT)    Date of Referral to OT  01/01/20  -MW        OT Diagnosis  decreased ADL  -MW        Prognosis (OT Eval)  good  -MW        Patient/Family Goals Statement (OT Eval)  return home w assist from son  -MW        Criteria for Skilled Therapeutic Interventions Met (OT Eval)  yes;treatment indicated  -MW        Rehab Potential (OT Eval)  good, to achieve stated therapy goals  -MW        Therapy Frequency (OT Eval)  5 times/wk  -MW        Predicted Duration of Therapy Intervention (Therapy Eval)  until d/c  -MW        Care Plan Review (OT)  evaluation/treatment results reviewed;care plan/treatment goals reviewed;risks/benefits reviewed;current/potential barriers reviewed;patient/other agree to care plan  -MW        Anticipated Discharge Disposition (OT)  home with 24/7 care;home with home health;transitional care;skilled nursing  facility  -           Planned OT Interventions    Planned Therapy Interventions (OT Eval)  activity tolerance training;BADL retraining;functional balance retraining;occupation/activity based interventions;patient/caregiver education/training;transfer/mobility retraining;strengthening exercise  -           OT Goals    Transfer Goal Selection (OT)  transfer, OT goal 1  -MW        Dressing Goal Selection (OT)  dressing, OT goal 1  -MW        Toileting Goal Selection (OT)  toileting, OT goal 1  -MW           Transfer Goal 1 (OT)    Activity/Assistive Device (Transfer Goal 1, OT)  sit-to-stand/stand-to-sit;bed-to-chair/chair-to-bed;toilet;tub;tub bench  -MW        [place] Level/Cues Needed (Transfer Goal 1, OT)  minimum assist (75% or more patient effort)  -MW        Time Frame (Transfer Goal 1, OT)  long term goal (LTG);by discharge  -MW        Progress/Outcome (Transfer Goal 1, OT)  goal ongoing  -           Dressing Goal 1 (OT)    Activity/Assistive Device (Dressing Goal 1, OT)  lower body dressing  -MW        [place]/Cues Needed (Dressing Goal 1, OT)  minimum assist (75% or more patient effort)  -MW        Time Frame (Dressing Goal 1, OT)  long term goal (LTG);by discharge  -MW        Progress/Outcome (Dressing Goal 1, OT)  goal ongoing  -           Toileting Goal 1 (OT)    Activity/Device (Toileting Goal 1, OT)  toileting skills, all;commode;commode, 3-in-1  -MW        [place] Level/Cues Needed (Toileting Goal 1, OT)  minimum assist (75% or more patient effort)  -MW        Time Frame (Toileting Goal 1, OT)  long term goal (LTG);by discharge  -MW        Progress/Outcome (Toileting Goal 1, OT)  goal ongoing  -MW           Living Environment    Home Accessibility  tub/shower is not walk in grab bars in shower  -MW          User Key  (r) = Recorded By, (t) = Taken By, (c) = Cosigned By    Initials Name Effective Dates    Sofy Guillen OTR/L 08/28/18 -                OT Recommendation and  Plan  Outcome Summary/Treatment Plan (OT)  Anticipated Discharge Disposition (OT): home with 24/7 care, home with home health, transitional care, skilled nursing facility  Planned Therapy Interventions (OT Eval): activity tolerance training, BADL retraining, functional balance retraining, occupation/activity based interventions, patient/caregiver education/training, transfer/mobility retraining, strengthening exercise  Therapy Frequency (OT Eval): 5 times/wk  Plan of Care Review  Plan of Care Reviewed With: patient, son  Plan of Care Reviewed With: patient, son  Outcome Summary: OT eval completed. Pt significantly limited by pain, rates 9/10 throughout eval. ModAx2 for all bed mobility and to come to stand. Unable to maintain TTWB to take steps at this time. Max-dep LB dressing and hygiene following incontinence. Pt reports she has been unable to control bladder when coughing and has increased tremors in RUE since injury. OT indicated to address ADL, transfer, and functional mobility to increase independence and safety. Pt and family plan for her to return home at d/c, recommend  vs transitional care for continued rehab.     Outcome Measures     Row Name 01/01/20 1400             How much help from another is currently needed...    Putting on and taking off regular lower body clothing?  2  -MW      Bathing (including washing, rinsing, and drying)  2  -MW      Toileting (which includes using toilet bed pan or urinal)  2  -MW      Putting on and taking off regular upper body clothing  3  -MW      Taking care of personal grooming (such as brushing teeth)  4  -MW      Eating meals  4  -MW      AM-PAC 6 Clicks Score (OT)  17  -MW         Functional Assessment    Outcome Measure Options  AM-PAC 6 Clicks Daily Activity (OT)  -MW        User Key  (r) = Recorded By, (t) = Taken By, (c) = Cosigned By    Initials Name Provider Type    Sofy Guillen, OTR/L Occupational Therapist          Time Calculation:   Time  Calculation- OT     Row Name 01/01/20 1416             Time Calculation- OT    OT Start Time  1300  -MW      OT Stop Time  1400  -MW      OT Time Calculation (min)  60 min  -MW      OT Received On  01/01/20  -MW      OT Goal Re-Cert Due Date  01/11/20  -MW        User Key  (r) = Recorded By, (t) = Taken By, (c) = Cosigned By    Initials Name Provider Type     Sofy Mesa, OTR/L Occupational Therapist        Therapy Charges for Today     Code Description Service Date Service Provider Modifiers Qty    [id number] HC OT EVAL MOD COMPLEXITY 4 1/1/2020 Sofy Mesa, OTR/L GO 1               Sofy Mesa OTR/L  1/1/2020

## 2020-01-01 NOTE — CONSULTS
MD Myron Talbert PA-C, [facility]         Orthopaedic Surgery Consult Note      2020   10:47 AM    Name:  Roberta Persaud  MRN:    [id number]     Acct:     [id number]   Room:  [address] [place] [address] Day: 0     Admit Date: 2020  PCP: Provider, No Known        Subjective:     HPI: 88 y.o. female with a history of COPD and CAD here in town for a . She has been in her usual state of health. Yesterday while going to the toilet, she slipped off the toilet and impacted her right hip and had immediate inguinal pain and inability to bear weight or walk. She did hit her head on the bathtub but denies any LOC and imaging has been negative. She has a history of a previous TFN on the right as well as a kyphoplasty per pelvic imaging. She is currently resting comfortably in bed with pain controlled. Son is at bedside and plans to take her back home to [place].        Medications:     Allergies:   Allergies   Allergen Reactions   • Bee Venom Anaphylaxis   • Penicillins Anaphylaxis   • Tetanus Toxoids Other (See Comments)     unknown       Current Meds:   Current Facility-Administered Medications   Medication Dose Route Frequency Provider Last Rate Last Dose   • atenolol (TENORMIN) tablet 25 mg  25 mg Oral Daily Herberth Alvarado,        • atorvastatin (LIPITOR) tablet 80 mg  80 mg Oral Daily Herberth Alvarado,        • budesonide-formoterol (SYMBICORT) 160-4.5 MCG/ACT inhaler 2 puff  2 puff Inhalation BID - RT Herberth Alvarado DO   2 puff at 20 0946   • diphenhydrAMINE (BENADRYL) capsule 25 mg  25 mg Oral Q6H PRN Herberth Alvarado DO       • gabapentin (NEURONTIN) capsule 600 mg  600 mg Oral Nightly Herberth Alvarado DO       • isosorbide mononitrate (IMDUR) 24 hr tablet 90 mg  90 mg Oral Daily Herberth Alvarado DO       • ketotifen (ZADITOR) 0.025 % ophthalmic solution 1 drop  1 drop Both Eyes Daily PRN Herberth Alvarado DO       • ondansetron (ZOFRAN) injection 4 mg  4 mg  Intravenous Q6H PRN Herberth Alvarado DO       • oxyCODONE-acetaminophen (PERCOCET)  MG per tablet 1 tablet  1 tablet Oral Q6H PRN Herberth Alvarado DO       • pantoprazole (PROTONIX) EC tablet 40 mg  40 mg Oral BID Herberth Alvarado DO       • senna (SENOKOT) tablet 8.6 mg  1 tablet Oral Daily PRN Herberth Alvarado DO       • sertraline (ZOLOFT) tablet 150 mg  150 mg Oral Daily Herberth Alvarado DO       • sodium chloride 0.9 % flush 10 mL  10 mL Intravenous PRN Xiao Paz, APRN       • sodium chloride 0.9 % flush 10 mL  10 mL Intravenous Q12H Herberth Alvarado DO       • sodium chloride 0.9 % flush 10 mL  10 mL Intravenous PRN Herberth Alvarado DO       • sodium chloride 0.9 % infusion  75 mL/hr Intravenous Continuous Herberth Alvarado DO 75 mL/hr at 01/01/20 1046 75 mL/hr at 01/01/20 1046           Data:     Code Status:    Code Status and Medical Interventions:   Ordered at: 01/01/20 0558     Level Of Support Discussed With:    Patient     Code Status:    CPR     Medical Interventions (Level of Support Prior to Arrest):    Full       History reviewed. No pertinent family history.    Social History     Socioeconomic History   • Marital status:      Spouse name: Not on file   • Number of children: Not on file   • Years of education: Not on file   • Highest education level: Not on file   Tobacco Use   • Smoking status: Former Smoker   Substance and Sexual Activity   • Alcohol use: No   • Drug use: No   • Sexual activity: Never       Past Medical History:   Diagnosis Date   • Arthritis    • COPD (chronic obstructive pulmonary disease) (CMS/[facility])    • Coronary artery disease    • GERD (gastroesophageal reflux disease)          Review of Symptoms:  CONSTITUTIONAL:  negative for  fevers, chills, sweats, fatigue, malaise, anorexia and weight loss  EYES:  negative for  double vision, blurred vision and visual disturbance  HEENT:  negative for  hearing loss, tinnitus, ear drainage,  "earaches, nasal congestion, epistaxis, snoring, sore mouth, sore throat, hoarseness and voice change  RESPIRATORY:  negative for  dry cough, cough with sputum, dyspnea, wheezing, hemoptysis, chest pain, pleuritic pain and cyanosis  CARDIOVASCULAR:  negative for  chest pain, palpitations, orthopnea, exertional chest pressure/discomfort, edema  GASTROINTESTINAL:  negative for nausea, vomiting, change in bowel habits, diarrhea, constipation, abdominal pain, jaundice, dysphagia, regurgitation, hematemesis and hemtochezia  GENITOURINARY:  negative for frequency, dysuria, nocturia, hesitancy and hematuria  INTEGUMENT/BREAST:  negative for rash, skin lesion(s), dryness, skin color change, pruritus, changes in hair and changes in nails  HEMATOLOGIC/LYMPHATIC:  negative for easy bruising, bleeding, lymphadenopathy, petechiae and swelling/edema  ALLERGIC/IMMUNOLOGIC:  negative for recurrent infections and urticaria  ENDOCRINE:  negative for heat intolerance, cold intolerance, tremor, weight changes, hair loss and diabetic symptoms including neither polyuria nor polydipsia  MUSCULOSKELETAL:  See HPI  NEUROLOGICAL:  negative for headaches, dizziness, seizures, memory problems, speech problems, visual disturbance, coordination problems, gait problems, tremor, syncope and near syncope  BEHAVIOR/PSYCH:  negative for depressed mood, elated mood, increased agitation and anxiety      Vitals:  /48 (BP Location: Left arm, Patient Position: Lying)   Pulse 72   Temp 97.5 °F (36.4 °C) (Oral)   Resp 16   Ht 165.1 cm (65\")   Wt 58.1 kg (128 lb)   SpO2 95%   BMI 21.30 kg/m²   Temp (24hrs), Av °F (36.7 °C), Min:97.5 °F (36.4 °C), Max:98.4 °F (36.9 °C)      I/O (24Hr):  No intake or output data in the 24 hours ending 20 1047    Labs:  Lab Results (last 72 hours)     Procedure Component Value Units Date/Time    BNP [[id number]]  (Abnormal) Collected:  20    Specimen:  Blood Updated:  20     proBNP " 2,628.0 pg/mL     Narrative:       Among patients with dyspnea, NT-proBNP is highly sensitive for the detection of acute congestive heart failure. In addition NT-proBNP of <300 pg/ml effectively rules out acute congestive heart failure with 99% negative predictive value.      Comprehensive Metabolic Panel [[id number]]  (Abnormal) Collected:  01/01/20 0151    Specimen:  Blood Updated:  01/01/20 0222     Glucose 101 mg/dL      BUN 35 mg/dL      Creatinine 2.13 mg/dL      Sodium 144 mmol/L      Potassium 4.0 mmol/L      Comment: Specimen hemolyzed.  Results may be affected.        Chloride 97 mmol/L      CO2 33.0 mmol/L      Calcium 9.3 mg/dL      Total Protein 7.2 g/dL      Albumin 4.30 g/dL      ALT (SGPT) 17 U/L      Comment: Specimen hemolyzed.  Results may be affected.        AST (SGOT) 29 U/L      Comment: Specimen hemolyzed.  Results may be affected.        Alkaline Phosphatase 40 U/L      Total Bilirubin 0.2 mg/dL      eGFR Non African Amer 22 mL/min/1.73      Globulin 2.9 gm/dL      A/G Ratio 1.5 g/dL      BUN/Creatinine Ratio 16.4     Anion Gap 14.0 mmol/L     Narrative:       GFR Normal >60  Chronic Kidney Disease <60  Kidney Failure <15      Troponin [[id number]]  (Normal) Collected:  01/01/20 0151    Specimen:  Blood Updated:  01/01/20 0218     Troponin T 0.017 ng/mL     Narrative:       Troponin T Reference Range:  <= 0.03 ng/mL-   Negative for AMI  >0.03 ng/mL-     Abnormal for myocardial necrosis.  Clinicians would have to utilize clinical acumen, EKG, Troponin and serial changes to determine if it is an Acute Myocardial Infarction or myocardial injury due to an underlying chronic condition.     aPTT [[id number]]  (Normal) Collected:  01/01/20 0151    Specimen:  Blood Updated:  01/01/20 0209     PTT 29.1 seconds     Protime-INR [[id number]]  (Abnormal) Collected:  01/01/20 0151    Specimen:  Blood Updated:  01/01/20 0209     Protime 11.8 Seconds      INR 0.84    CBC & Differential [[id number]] Collected:   01/01/20 0151    Specimen:  Blood Updated:  01/01/20 0204    Narrative:       The following orders were created for panel order CBC & Differential.  Procedure                               Abnormality         Status                     ---------                               -----------         ------                     CBC Auto Differential[[id number]]        Abnormal            Final result                 Please view results for these tests on the individual orders.    CBC Auto Differential [[id number]]  (Abnormal) Collected:  01/01/20 0151    Specimen:  Blood Updated:  01/01/20 0204     WBC 17.27 10*3/mm3      RBC 3.88 10*6/mm3      Hemoglobin 10.6 g/dL      Hematocrit 35.3 %      MCV 91.0 fL      MCH 27.3 pg      MCHC 30.0 g/dL      RDW 16.4 %      RDW-SD 54.4 fl      MPV 11.4 fL      Platelets 417 10*3/mm3      Neutrophil % 83.3 %      Lymphocyte % 6.8 %      Monocyte % 6.3 %      Eosinophil % 1.9 %      Basophil % 0.5 %      Immature Grans % 1.2 %      Neutrophils, Absolute 14.40 10*3/mm3      Lymphocytes, Absolute 1.17 10*3/mm3      Monocytes, Absolute 1.09 10*3/mm3      Eosinophils, Absolute 0.32 10*3/mm3      Basophils, Absolute 0.09 10*3/mm3      Immature Grans, Absolute 0.20 10*3/mm3      nRBC 0.0 /100 WBC           Imaging:  EXAMINATION:  XR HIP W OR WO PELVIS 2-3 VIEW RIGHT-  1/1/2020 2:10 AM  CST     HISTORY: The patient fell. Right hip pain.     COMPARISON: No comparison study.     TECHNIQUE: AP and crosstable lateral views of the right hip were  supplemented with an AP image of the pelvis.     FINDINGS: There is a comminuted acute fracture of the superior pubic  ramus on the right. There is also an inferior pubic ramus fracture on  the right. There has been prior internal repair of a right proximal  femur fracture that appears to be well healed.     IMPRESSION:  1. Acute fractures of the superior and inferior pubic rami on the right  side.  2. Chronic proximal right femur fracture with internal  repair.  This report was finalized on 01/01/2020 07:49 by Dr. Rodrick Byrne MD.    Agree with radiology reading. No periprosthetic fracture or hardware failure of right intertroch fracture noted.      Physical Exam:     General: Pleasant mood and appropriate affect. Well nourished.  HEENT: NC/AT, JAYLON, EOMI, Nares patent bilaterally with no epistaxis noted.  Neck: Soft throughout with no obvious masses.  Chest: +2 distal pulses throughout, no heaves or lifts seen.  Respiratory: Equal rise and fall bilaterally, no retractions or rhonchi or wheezes noted.  Abdomen: Obese and non tender.  Skin: Pink and dry with appropriate turgor.  Neuro: CN II-XII grossly intact, no focal deficits noted.  Right Hip: Previous surgical incisions well healed. No leg length discrepancy. TTP about inguinal aspect and pain with WB or significant ROM of hip. NVI distally.      Assessment:     Primary Orthopaedic Problem  Right superior and inferior pubic rami fractures, closed. Superior is displaced, inferior is not.         Plan:     · Allow for TTWB on right side as tolerated and allow patient to auto protect.  · Patient currently not on anticoagulation, recommend xarelto 10mg daily or lovenox 30mg SQ daily for 21 days.  · Ok for PT to ambulate.  · Ok for DC at any time per ortho with local ortho follow up. Discussed non operative nature of the fractures and the son understands and agrees with plan. Overall estimated healing time 8-10 weeks.            Electronically signed by Myron Astudillo Jr., PA on 1/1/2020 at 10:47 AM

## 2020-01-01 NOTE — H&P
"    [facility] Medicine Services  HISTORY AND PHYSICAL    Date of Admission: 1/1/2020  Primary Care Physician: Provider, No Known    Subjective     Chief Complaint: Hip pain    History of Present Illness  88-year-old  female who presents to the emergency department after fall with hip pain.  Her grandson assist with giving history, and states that she had a big day yesterday and fell asleep on the toilet.  She states that she slipped off and landed on the ground.  She does state that she has chronic right leg problems and uses a wheelchair to get around.  She has a mid abdominal hernia.  She has chronic breathing problems and wears home oxygen.  She any acute bowel or kidney dysfunction.  The patient is from [place].  I did discuss with the patient's son if he wanted her to be in a facility for rehab in [place], and he states \" if you are not good to do anything all just take her back home to [place].\"        Review of Systems   Hip pain  Chronic shortness of breath with use of home oxygen    Otherwise complete ROS reviewed and negative except as mentioned in the HPI.    Past Medical History:   Past Medical History:   Diagnosis Date   • Arthritis    • COPD (chronic obstructive pulmonary disease) (CMS/[facility])    • Coronary artery disease    • GERD (gastroesophageal reflux disease)      Past Surgical History:  Past Surgical History:   Procedure Laterality Date   • CORONARY ARTERY BYPASS GRAFT  2001     Social History:  reports that she has quit smoking. She does not have any smokeless tobacco history on file. She reports that she does not drink alcohol or use drugs.    Family History: CAD    Allergies:  Allergies   Allergen Reactions   • Bee Venom Anaphylaxis   • Penicillins Anaphylaxis   • Tetanus Toxoids Other (See Comments)     unknown     Medications:  Prior to Admission medications    Medication Sig Start Date End Date Taking? Authorizing Provider   tiotropium " (SPIRIVA) 18 MCG per inhalation capsule Place 1 capsule into inhaler and inhale Daily.   Yes Ana Fagan MD   aspirin 81 MG EC tablet Take 81 mg by mouth Daily.    Ana Fagan MD   atenolol (TENORMIN) 25 MG tablet Take 25 mg by mouth Daily.    Ana Fagan MD   atorvastatin (LIPITOR) 80 MG tablet Take 80 mg by mouth Daily.    Ana Fagan MD   budesonide-formoterol (SYMBICORT) 160-4.5 MCG/ACT inhaler Inhale 2 puffs 2 (Two) Times a Day.    Ana Fagan MD   Calcium Carbonate-Vitamin D (CALCIUM-VITAMIN D) 500-200 MG-UNIT per tablet Take 2 tablets by mouth 2 (Two) Times a Day With Meals. 3/26/17   Maral Elise APRN   diphenhydrAMINE (BENADRYL) 25 mg capsule Take 25 mg by mouth Every 6 (Six) Hours As Needed for Itching.    Ana Fagan MD   furosemide (LASIX) 40 MG tablet Take 40 mg by mouth 2 (Two) Times a Day.    Ana Fagan MD   gabapentin (NEURONTIN) 300 MG capsule Take 600 mg by mouth Every Morning.    Ana Fagan MD   gabapentin (NEURONTIN) 300 MG capsule Take 600 mg by mouth Every Night.    Ana Fagan MD   isosorbide mononitrate (IMDUR) 60 MG 24 hr tablet Take 90 mg by mouth Daily.    Ana Fagan MD   ketotifen (ZADITOR) 0.025 % ophthalmic solution Administer 1 drop to both eyes Daily As Needed (for dry eyes).    Ana Fagan MD   MethylPREDNISolone (MEDROL, CARTER,) 4 MG tablet Take as directed on package instructions. 3/26/17   Maral Elise APRN   MethylPREDNISolone (MEDROL, CARTER,) 4 MG tablet Take as directed on package instructions. 3/27/17   Maral Elise APRN   MethylPREDNISolone (MEDROL, CARTER,) 4 MG tablet Take as directed on package instructions. 3/28/17   Maral Elise APRN   MethylPREDNISolone (MEDROL, CARTRE,) 4 MG tablet Take as directed on package instructions. 3/29/17   Maral Elise APRN   MethylPREDNISolone (MEDROL, CARTER,) 4 MG tablet Take as directed on package instructions.  "3/30/17   Maral Elise APRN   MethylPREDNISolone (MEDROL, CARTER,) 4 MG tablet Take as directed on package instructions. 3/26/17   Maral Elise APRN   naproxen (NAPROSYN) 250 MG tablet Take 250 mg by mouth 2 (Two) Times a Day As Needed for Mild Pain (1-3).    Ana Fagan MD   NITROGLYCERIN SL Take 8.4 mg by mouth 1 (One) Time As Needed for Chest Pain. Take no more than 3 doses in 15 minutes.     Ana Fagan MD   omeprazole (priLOSEC) 40 MG capsule Take 40 mg by mouth 2 (Two) Times a Day.    Ana Fagan MD   oxyCODONE-acetaminophen (PERCOCET)  MG per tablet Take 1 tablet by mouth Every 6 (Six) Hours As Needed for Moderate Pain (4-6).    Ana Fagan MD   senna (SENNA LAX) 8.6 MG tablet Take 1 tablet by mouth Daily As Needed for Constipation.    Ana Fagan MD   sertraline (ZOLOFT) 100 MG tablet Take 150 mg by mouth Daily.    Ana Fagan MD     Objective     Vital Signs: /43   Pulse 71   Temp 98.4 °F (36.9 °C) (Oral)   Resp 18   Ht 162.6 cm (64\")   Wt 57.6 kg (127 lb)   SpO2 97%   BMI 21.80 kg/m²   Physical Exam   Constitutional:   Thin and frail   HENT:   Head: Normocephalic and atraumatic.   Nose: Nose normal.   Mouth/Throat: Oropharynx is clear and moist.   Eyes: Conjunctivae and EOM are normal.   Neck: Normal range of motion. Neck supple.   Cardiovascular: Normal rate, regular rhythm and normal heart sounds.   Pulmonary/Chest: Effort normal and breath sounds normal.   Mid xiphoid hernia, nontender to palpation   Abdominal: Soft. Bowel sounds are normal.   Musculoskeletal: She exhibits no edema or tenderness.   Neurological: She is alert. No cranial nerve deficit.   Skin: Skin is warm and dry.   Psychiatric: She has a normal mood and affect.   Vitals reviewed.          Results Reviewed:  Lab Results (last 24 hours)     Procedure Component Value Units Date/Time    BNP [[id number]]  (Abnormal) Collected:  01/01/20 0151    Specimen:  " Blood Updated:  01/01/20 0305     proBNP 2,628.0 pg/mL     Narrative:       Among patients with dyspnea, NT-proBNP is highly sensitive for the detection of acute congestive heart failure. In addition NT-proBNP of <300 pg/ml effectively rules out acute congestive heart failure with 99% negative predictive value.      Comprehensive Metabolic Panel [[id number]]  (Abnormal) Collected:  01/01/20 0151    Specimen:  Blood Updated:  01/01/20 0222     Glucose 101 mg/dL      BUN 35 mg/dL      Creatinine 2.13 mg/dL      Sodium 144 mmol/L      Potassium 4.0 mmol/L      Comment: Specimen hemolyzed.  Results may be affected.        Chloride 97 mmol/L      CO2 33.0 mmol/L      Calcium 9.3 mg/dL      Total Protein 7.2 g/dL      Albumin 4.30 g/dL      ALT (SGPT) 17 U/L      Comment: Specimen hemolyzed.  Results may be affected.        AST (SGOT) 29 U/L      Comment: Specimen hemolyzed.  Results may be affected.        Alkaline Phosphatase 40 U/L      Total Bilirubin 0.2 mg/dL      eGFR Non African Amer 22 mL/min/1.73      Globulin 2.9 gm/dL      A/G Ratio 1.5 g/dL      BUN/Creatinine Ratio 16.4     Anion Gap 14.0 mmol/L     Narrative:       GFR Normal >60  Chronic Kidney Disease <60  Kidney Failure <15      Troponin [[id number]]  (Normal) Collected:  01/01/20 0151    Specimen:  Blood Updated:  01/01/20 0218     Troponin T 0.017 ng/mL     Narrative:       Troponin T Reference Range:  <= 0.03 ng/mL-   Negative for AMI  >0.03 ng/mL-     Abnormal for myocardial necrosis.  Clinicians would have to utilize clinical acumen, EKG, Troponin and serial changes to determine if it is an Acute Myocardial Infarction or myocardial injury due to an underlying chronic condition.     aPTT [[id number]]  (Normal) Collected:  01/01/20 0151    Specimen:  Blood Updated:  01/01/20 0209     PTT 29.1 seconds     Protime-INR [[id number]]  (Abnormal) Collected:  01/01/20 0151    Specimen:  Blood Updated:  01/01/20 0209     Protime 11.8 Seconds      INR 0.84    CBC &  Differential [[id number]] Collected:  01/01/20 0151    Specimen:  Blood Updated:  01/01/20 0204    Narrative:       The following orders were created for panel order CBC & Differential.  Procedure                               Abnormality         Status                     ---------                               -----------         ------                     CBC Auto Differential[[id number]]        Abnormal            Final result                 Please view results for these tests on the individual orders.    CBC Auto Differential [[id number]]  (Abnormal) Collected:  01/01/20 0151    Specimen:  Blood Updated:  01/01/20 0204     WBC 17.27 10*3/mm3      RBC 3.88 10*6/mm3      Hemoglobin 10.6 g/dL      Hematocrit 35.3 %      MCV 91.0 fL      MCH 27.3 pg      MCHC 30.0 g/dL      RDW 16.4 %      RDW-SD 54.4 fl      MPV 11.4 fL      Platelets 417 10*3/mm3      Neutrophil % 83.3 %      Lymphocyte % 6.8 %      Monocyte % 6.3 %      Eosinophil % 1.9 %      Basophil % 0.5 %      Immature Grans % 1.2 %      Neutrophils, Absolute 14.40 10*3/mm3      Lymphocytes, Absolute 1.17 10*3/mm3      Monocytes, Absolute 1.09 10*3/mm3      Eosinophils, Absolute 0.32 10*3/mm3      Basophils, Absolute 0.09 10*3/mm3      Immature Grans, Absolute 0.20 10*3/mm3      nRBC 0.0 /100 WBC         Imaging Results (Last 24 Hours)     Procedure Component Value Units Date/Time    CT Head Without Contrast [[id number]] Resulted:  01/01/20 0256     Updated:  01/01/20 0338    CT Cervical Spine Without Contrast [[id number]] Resulted:  01/01/20 0256     Updated:  01/01/20 0338    CT Thoracic Spine Without Contrast [[id number]] Resulted:  01/01/20 0256     Updated:  01/01/20 0338    XR Chest 1 View [[id number]] Resulted:  01/01/20 0235     Updated:  01/01/20 0235    XR Hip With or Without Pelvis 2 - 3 View Right [[id number]]  (Abnormal) Resulted:  01/01/20 0234     Updated:  01/01/20 0234        I have personally reviewed and interpreted the radiology studies and ECG  obtained at time of admission.     Assessment / Plan     Assessment:   Active Hospital Problems    Diagnosis   • Closed displaced fracture of pelvis (CMS/[facility])     Impression:  1.  Pelvic fracture  2.  COPD, oxygen dependent not acute exacerbation  3.  Renal insufficiency, the patient's last creatinine on 2/24/2019 was 1.3  4.  Nonspecific anemia    Plan:   1.  Consult orthopedics  2.  PT OT consult  3.  Resume home medications  4.  Pain control  5.  Labs in the morning  6.  O2 support      Code Status: Full     I discussed the patient's findings and my recommendations with the patient and family at bedside    Estimated length of stay overnight    Herberth Alvarado DO   01/01/20   5:59 AM

## 2020-01-01 NOTE — PLAN OF CARE
Problem: Patient Care Overview  Goal: Plan of Care Review  Outcome: Ongoing (interventions implemented as appropriate)  Flowsheets (Taken 1/1/2020 1300)  Plan of Care Reviewed With: patient;son  Outcome Summary: PT IE complete.  Mod assist x2 for bed mobility and standing bedside.  TTWB RLE w/ RW.  9/10 pain R side of pelvis.  91% w/ 2.5L O2 per NC.  Recommend home health vs. transitional care at [place].  Thank you for referral.

## 2020-01-01 NOTE — ED PROVIDER NOTES
"Subjective   Patient is an 88-year-old female with a history of coronary artery disease and COPD the presents to the ER today with complaint of fall.  The patient states that approximately an hour and a half prior to arrival she was sitting on the commode in a hotel that she is staying at.  She states that she went to stand up and her feet went out from underneath her.  She states that she fell landing on the right hip and hit her head and back against the bathtub.  She states that after that she then began to have chest pain.  The patient reports she is having head neck and upper back pain as well as right hip pain.  She states that she is in [place] from out of town and that she had her grandsons  today.  Patient is tearful.  She presents here today for further evaluation.  She denies any loss of consciousness but states that she was \"stunned \" when the fall occurred.       History provided by:  Patient   used: No    Fall   Mechanism of injury: fall    Injury location:  Head/neck  Head/neck injury location:  Head, L neck and R neck  Incident location:  Home  Time since incident:  1 hour  Arrived directly from scene: yes    Fall:     Fall occurred: from toilet.    Impact surface:  Hard floor    Point of impact: rt hip, head, back.    Entrapped after fall: no    Suspicion of alcohol use: no    Suspicion of drug use: no    Associated symptoms: back pain and neck pain    Associated symptoms: no abdominal pain, no blindness, no chest pain, no difficulty breathing, no headaches, no hearing loss, no loss of consciousness, no nausea, no seizures and no vomiting    Risk factors: CAD and COPD    Risk factors: no AICD, no anticoagulation therapy, no asthma, no beta blocker therapy, no CABG, no CHF, no diabetes, no dialysis, no hemophilia, no kidney disease, no pacemaker, no past MI, not pregnant and no steroid use        Review of Systems   HENT: Negative for hearing loss.    Eyes: Negative for " blindness.   Cardiovascular: Negative for chest pain.   Gastrointestinal: Negative for abdominal pain, nausea and vomiting.   Musculoskeletal: Positive for back pain and neck pain.   Neurological: Negative for seizures, loss of consciousness and headaches.   All other systems reviewed and are negative.      Past Medical History:   Diagnosis Date   • Arthritis    • COPD (chronic obstructive pulmonary disease) (CMS/[facility])    • Coronary artery disease    • GERD (gastroesophageal reflux disease)        Allergies   Allergen Reactions   • Bee Venom Anaphylaxis   • Penicillins Anaphylaxis   • Tetanus Toxoids Other (See Comments)     unknown       Past Surgical History:   Procedure Laterality Date   • CORONARY ARTERY BYPASS GRAFT  2001       History reviewed. No pertinent family history.    Social History     Socioeconomic History   • Marital status:      Spouse name: Not on file   • Number of children: Not on file   • Years of education: Not on file   • Highest education level: Not on file   Tobacco Use   • Smoking status: Former Smoker   Substance and Sexual Activity   • Alcohol use: No   • Drug use: No   • Sexual activity: Never           Objective   Physical Exam   Constitutional: She is oriented to person, place, and time. She appears well-developed and well-nourished.   HENT:   Head: Normocephalic and atraumatic.   Eyes: Pupils are equal, round, and reactive to light. Conjunctivae are normal.   Neck:       Cardiovascular: Normal rate, regular rhythm and normal heart sounds.   Pulmonary/Chest: Effort normal and breath sounds normal.   Abdominal: Soft. Bowel sounds are normal.   Musculoskeletal:        Back:         Legs:  Neurological: She is alert and oriented to person, place, and time.   Skin: Skin is warm and dry. Capillary refill takes less than 2 seconds.   Psychiatric: She has a normal mood and affect.   Nursing note and vitals reviewed.      Procedures           ED Course  ED Course as of Jan 17 0731    Wed Jan 01, 2020   0302 Pt case discussed with Dr. Mcneal who will assume pt case at this time.     [LF]   0502 Patient came to the ER has got slight elevation of white cell count which has been noted in the past also she had fallen today CT of the head is negative CT of cervical spine is negative CT thoracic spine shows chronic compression deformities she is got a pelvic fracture.  Neurovascular intact I have discussed this case with the patient and family will place in the hospital she also has acute kidney injury.    [TS]   0504 Further evaluation of leukocytosis will have been performed this could be stress related there is no abdominal pain the patient did not complain of any dysuria urgency or frequency but she is 88 years old and require further testing for this    [TS]      ED Course User Index  [LF] Xiao Paz, CARY  [TS] Ton Mcneal MD                                     CT Head Without Contrast   Final Result   1. No acute hemorrhage, edema or mass effect.   2. Atrophy and chronic ischemic white matter disease, stable.       This report was finalized on 01/01/2020 07:25 by Dr. Rodrick Byrne MD.      CT Cervical Spine Without Contrast   Final Result   1. No evidence of cervical spine fracture.   2. Degenerative changes, as described.       This report was finalized on 01/01/2020 07:34 by Dr. Rodrick Byrne MD.      CT Thoracic Spine Without Contrast   Final Result   1. Thoracic spine compression deformities, as described.   2. Degenerative disc disease at multiple levels.   3. Atheromatous disease of the thoracic aorta and coronary arteries.   Dilated pulmonary arteries.   This report was finalized on 01/01/2020 07:41 by Dr. Rodrick Byrne MD.      XR Hip With or Without Pelvis 2 - 3 View Right   ED Interpretation   Abnormal   Pelvic fracture      Final Result   1. Acute fractures of the superior and inferior pubic rami on the right   side.   2. Chronic proximal right femur fracture with  internal repair.   This report was finalized on 01/01/2020 07:49 by Dr. Rodrick Byrne MD.      XR Chest 1 View   ED Interpretation   No acute findings, pulmonary fibrosis      Final Result   1. Cardiomegaly with vascular redistribution.   2. Interstitial infiltrates, likely edema.   3. Stable chronic bronchial wall thickening.           This report was finalized on 01/01/2020 07:50 by Dr. Rodrick Byrne MD.        Labs Reviewed   COMPREHENSIVE METABOLIC PANEL - Abnormal; Notable for the following components:       Result Value    Glucose 101 (*)     BUN 35 (*)     Creatinine 2.13 (*)     Chloride 97 (*)     CO2 33.0 (*)     eGFR Non  Amer 22 (*)     All other components within normal limits    Narrative:     GFR Normal >60  Chronic Kidney Disease <60  Kidney Failure <15     PROTIME-INR - Abnormal; Notable for the following components:    Protime 11.8 (*)     INR 0.84 (*)     All other components within normal limits   CBC WITH AUTO DIFFERENTIAL - Abnormal; Notable for the following components:    WBC 17.27 (*)     Hemoglobin 10.6 (*)     MCHC 30.0 (*)     RDW 16.4 (*)     RDW-SD 54.4 (*)     Neutrophil % 83.3 (*)     Lymphocyte % 6.8 (*)     Immature Grans % 1.2 (*)     Neutrophils, Absolute 14.40 (*)     Monocytes, Absolute 1.09 (*)     Immature Grans, Absolute 0.20 (*)     All other components within normal limits   BNP (IN-HOUSE) - Abnormal; Notable for the following components:    proBNP 2,628.0 (*)     All other components within normal limits    Narrative:     Among patients with dyspnea, NT-proBNP is highly sensitive for the detection of acute congestive heart failure. In addition NT-proBNP of <300 pg/ml effectively rules out acute congestive heart failure with 99% negative predictive value.     CBC (NO DIFF) - Abnormal; Notable for the following components:    RBC 3.31 (*)     Hemoglobin 9.3 (*)     Hematocrit 30.9 (*)     MCHC 30.1 (*)     RDW 16.2 (*)     RDW-SD 55.2 (*)     All other components  within normal limits   BASIC METABOLIC PANEL - Abnormal; Notable for the following components:    Glucose 102 (*)     BUN 32 (*)     Creatinine 1.57 (*)     CO2 36.0 (*)     Calcium 8.5 (*)     eGFR Non  Amer 31 (*)     All other components within normal limits    Narrative:     GFR Normal >60  Chronic Kidney Disease <60  Kidney Failure <15     CBC (NO DIFF) - Abnormal; Notable for the following components:    RBC 2.97 (*)     Hemoglobin 8.4 (*)     Hematocrit 27.9 (*)     MCHC 30.1 (*)     RDW 15.9 (*)     RDW-SD 54.7 (*)     All other components within normal limits   BASIC METABOLIC PANEL - Abnormal; Notable for the following components:    Glucose 111 (*)     BUN 42 (*)     Creatinine 2.14 (*)     Chloride 95 (*)     CO2 34.0 (*)     Calcium 8.4 (*)     eGFR Non  Amer 22 (*)     All other components within normal limits    Narrative:     GFR Normal >60  Chronic Kidney Disease <60  Kidney Failure <15     RENAL FUNCTION PANEL - Abnormal; Notable for the following components:    Glucose 143 (*)     BUN 43 (*)     Creatinine 1.77 (*)     Sodium 135 (*)     Chloride 94 (*)     CO2 31.0 (*)     Calcium 8.4 (*)     eGFR Non  Amer 27 (*)     All other components within normal limits    Narrative:     GFR Normal >60  Chronic Kidney Disease <60  Kidney Failure <15     CBC (NO DIFF) - Abnormal; Notable for the following components:    RBC 2.63 (*)     Hemoglobin 7.4 (*)     Hematocrit 24.2 (*)     MCHC 30.6 (*)     RDW 15.9 (*)     All other components within normal limits   BASIC METABOLIC PANEL - Abnormal; Notable for the following components:    Glucose 109 (*)     BUN 48 (*)     Creatinine 1.91 (*)     Sodium 135 (*)     Chloride 94 (*)     CO2 31.0 (*)     Calcium 8.2 (*)     eGFR Non African Amer 25 (*)     BUN/Creatinine Ratio 25.1 (*)     All other components within normal limits    Narrative:     GFR Normal >60  Chronic Kidney Disease <60  Kidney Failure <15     APTT - Normal   TROPONIN  (IN-HOUSE) - Normal    Narrative:     Troponin T Reference Range:  <= 0.03 ng/mL-   Negative for AMI  >0.03 ng/mL-     Abnormal for myocardial necrosis.  Clinicians would have to utilize clinical acumen, EKG, Troponin and serial changes to determine if it is an Acute Myocardial Infarction or myocardial injury due to an underlying chronic condition.    TSPOT   TYPE AND SCREEN   PREPARE RBC   TYPE AND SCREEN   CBC AND DIFFERENTIAL    Narrative:     The following orders were created for panel order CBC & Differential.  Procedure                               Abnormality         Status                     ---------                               -----------         ------                     CBC Auto Differential[[id number]]        Abnormal            Final result                 Please view results for these tests on the individual orders.               MDM  Number of Diagnoses or Management Options  KASANDRA (acute kidney injury) (CMS/[facility]): new and requires workup  Closed displaced fracture of pelvis, unspecified part of pelvis, initial encounter (CMS/[facility]): new and requires workup  Leukocytosis, unspecified type: new and requires workup     Amount and/or Complexity of Data Reviewed  Clinical lab tests: ordered and reviewed  Tests in the radiology section of CPT®: ordered and reviewed  Decide to obtain previous medical records or to obtain history from someone other than the patient: yes  Discuss the patient with other providers: yes  Independent visualization of images, tracings, or specimens: yes    Patient Progress  Patient progress: stable      Final diagnoses:   Closed displaced fracture of pelvis, unspecified part of pelvis, initial encounter (CMS/[facility])   KASANDRA (acute kidney injury) (CMS/[facility])   Leukocytosis, unspecified type            Xiao Paz, APRN  01/17/20 0731

## 2020-01-01 NOTE — ED PROVIDER NOTES
Subjective   History of Present Illness    Review of Systems    Past Medical History:   Diagnosis Date   • Arthritis    • COPD (chronic obstructive pulmonary disease) (CMS/HCC)    • Coronary artery disease    • GERD (gastroesophageal reflux disease)        Allergies   Allergen Reactions   • Bee Venom Anaphylaxis   • Penicillins Anaphylaxis   • Tetanus Toxoids Other (See Comments)     unknown       Past Surgical History:   Procedure Laterality Date   • CORONARY ARTERY BYPASS GRAFT  2001       History reviewed. No pertinent family history.    Social History     Socioeconomic History   • Marital status:      Spouse name: Not on file   • Number of children: Not on file   • Years of education: Not on file   • Highest education level: Not on file   Tobacco Use   • Smoking status: Former Smoker   Substance and Sexual Activity   • Alcohol use: No   • Drug use: No   • Sexual activity: Never           Objective   Physical Exam    Procedures           ED Course  ED Course as of Jan 01 0505 Wed Jan 01, 2020   0302 Pt case discussed with Dr. Mcneal who will assume pt case at this time.     [LF]   0502 Patient came to the ER has got slight elevation of white cell count which has been noted in the past also she had fallen today CT of the head is negative CT of cervical spine is negative CT thoracic spine shows chronic compression deformities she is got a pelvic fracture.  Neurovascular intact I have discussed this case with the patient and family will place in the hospital she also has acute kidney injury.    [TS]   0504 Further evaluation of leukocytosis will have been performed this could be stress related there is no abdominal pain the patient did not complain of any dysuria urgency or frequency but she is 88 years old and require further testing for this    [TS]      ED Course User Index  [LF] Xiao Paz, APRN  [TS] Ton Mcneal MD                                               [facility]    Final diagnoses:    Closed displaced fracture of pelvis, unspecified part of pelvis, initial encounter (CMS/[facility])   KASANDRA (acute kidney injury) (CMS/[facility])   Leukocytosis, unspecified type            Ton Mcneal MD  01/01/20 7802

## 2020-01-01 NOTE — PLAN OF CARE
Problem: Patient Care Overview  Goal: Plan of Care Review  Outcome: Ongoing (interventions implemented as appropriate)  Flowsheets (Taken 1/1/2020 1315)  Plan of Care Reviewed With: patient;son  Outcome Summary: OT eval completed. Pt significantly limited by pain, rates 9/10 throughout eval. ModAx2 for all bed mobility and to come to stand. Unable to maintain TTWB to take steps at this time. Max-dep LB dressing and hygiene following incontinence. Pt reports she has been unable to control bladder when coughing and has increased tremors in RUE since injury. OT indicated to address ADL, transfer, and functional mobility to increase independence and safety. Pt and family plan for her to return home at d/c which would include 5 hour drive to [place].  Recommend SNF vs transitional care stay for continued rehab prior to return home.

## 2020-01-02 LAB
ANION GAP SERPL CALCULATED.3IONS-SCNC: 10 MMOL/L (ref 5–15)
BUN BLD-MCNC: 32 MG/DL (ref 8–23)
BUN/CREAT SERPL: 20.4 (ref 7–25)
CALCIUM SPEC-SCNC: 8.5 MG/DL (ref 8.6–10.5)
CHLORIDE SERPL-SCNC: 99 MMOL/L (ref 98–107)
CO2 SERPL-SCNC: 36 MMOL/L (ref 22–29)
CREAT BLD-MCNC: 1.57 MG/DL (ref 0.57–1)
DEPRECATED RDW RBC AUTO: 55.2 FL (ref 37–54)
ERYTHROCYTE [DISTWIDTH] IN BLOOD BY AUTOMATED COUNT: 16.2 % (ref 12.3–15.4)
GFR SERPL CREATININE-BSD FRML MDRD: 31 ML/MIN/1.73
GLUCOSE BLD-MCNC: 102 MG/DL (ref 65–99)
HCT VFR BLD AUTO: 30.9 % (ref 34–46.6)
HGB BLD-MCNC: 9.3 G/DL (ref 12–15.9)
MCH RBC QN AUTO: 28.1 PG (ref 26.6–33)
MCHC RBC AUTO-ENTMCNC: 30.1 G/DL (ref 31.5–35.7)
MCV RBC AUTO: 93.4 FL (ref 79–97)
PLATELET # BLD AUTO: 326 10*3/MM3 (ref 140–450)
PMV BLD AUTO: 11.9 FL (ref 6–12)
POTASSIUM BLD-SCNC: 4.3 MMOL/L (ref 3.5–5.2)
RBC # BLD AUTO: 3.31 10*6/MM3 (ref 3.77–5.28)
SODIUM BLD-SCNC: 145 MMOL/L (ref 136–145)
WBC NRBC COR # BLD: 8.75 10*3/MM3 (ref 3.4–10.8)

## 2020-01-02 PROCEDURE — 25010000002 ENOXAPARIN PER 10 MG: Performed by: INTERNAL MEDICINE

## 2020-01-02 PROCEDURE — 25010000002 FUROSEMIDE PER 20 MG: Performed by: INTERNAL MEDICINE

## 2020-01-02 PROCEDURE — 85027 COMPLETE CBC AUTOMATED: CPT | Performed by: INTERNAL MEDICINE

## 2020-01-02 PROCEDURE — 80048 BASIC METABOLIC PNL TOTAL CA: CPT | Performed by: INTERNAL MEDICINE

## 2020-01-02 PROCEDURE — 94799 UNLISTED PULMONARY SVC/PX: CPT

## 2020-01-02 PROCEDURE — 97530 THERAPEUTIC ACTIVITIES: CPT

## 2020-01-02 RX ORDER — ACETAMINOPHEN 500 MG
500 TABLET ORAL EVERY 6 HOURS
Status: DISCONTINUED | OUTPATIENT
Start: 2020-01-02 | End: 2020-01-06 | Stop reason: HOSPADM

## 2020-01-02 RX ORDER — IBUPROFEN 600 MG/1
600 TABLET ORAL ONCE
Status: COMPLETED | OUTPATIENT
Start: 2020-01-02 | End: 2020-01-02

## 2020-01-02 RX ORDER — FUROSEMIDE 10 MG/ML
40 INJECTION INTRAMUSCULAR; INTRAVENOUS ONCE
Status: COMPLETED | OUTPATIENT
Start: 2020-01-02 | End: 2020-01-02

## 2020-01-02 RX ORDER — OXYCODONE AND ACETAMINOPHEN 10; 325 MG/1; MG/1
1 TABLET ORAL EVERY 4 HOURS PRN
Status: DISCONTINUED | OUTPATIENT
Start: 2020-01-02 | End: 2020-01-06 | Stop reason: HOSPADM

## 2020-01-02 RX ORDER — SENNA AND DOCUSATE SODIUM 50; 8.6 MG/1; MG/1
1 TABLET, FILM COATED ORAL 2 TIMES DAILY
Status: DISCONTINUED | OUTPATIENT
Start: 2020-01-02 | End: 2020-01-03

## 2020-01-02 RX ADMIN — ENOXAPARIN SODIUM 30 MG: 30 INJECTION SUBCUTANEOUS at 18:24

## 2020-01-02 RX ADMIN — ISOSORBIDE MONONITRATE 90 MG: 30 TABLET, EXTENDED RELEASE ORAL at 08:37

## 2020-01-02 RX ADMIN — ACETAMINOPHEN 500 MG: 500 TABLET, FILM COATED ORAL at 13:36

## 2020-01-02 RX ADMIN — SENNOSIDES AND DOCUSATE SODIUM 1 TABLET: 8.6; 5 TABLET ORAL at 13:36

## 2020-01-02 RX ADMIN — BUDESONIDE AND FORMOTEROL FUMARATE DIHYDRATE 2 PUFF: 160; 4.5 AEROSOL RESPIRATORY (INHALATION) at 19:02

## 2020-01-02 RX ADMIN — BUDESONIDE AND FORMOTEROL FUMARATE DIHYDRATE 2 PUFF: 160; 4.5 AEROSOL RESPIRATORY (INHALATION) at 07:26

## 2020-01-02 RX ADMIN — FUROSEMIDE 40 MG: 10 INJECTION, SOLUTION INTRAVENOUS at 13:36

## 2020-01-02 RX ADMIN — OXYCODONE HYDROCHLORIDE AND ACETAMINOPHEN 1 TABLET: 10; 325 TABLET ORAL at 05:46

## 2020-01-02 RX ADMIN — IBUPROFEN 600 MG: 600 TABLET ORAL at 13:36

## 2020-01-02 RX ADMIN — ATENOLOL 25 MG: 25 TABLET ORAL at 08:37

## 2020-01-02 RX ADMIN — PANTOPRAZOLE SODIUM 40 MG: 40 TABLET, DELAYED RELEASE ORAL at 08:37

## 2020-01-02 RX ADMIN — POLYETHYLENE GLYCOL (3350) 17 G: 17 POWDER, FOR SOLUTION ORAL at 13:36

## 2020-01-02 RX ADMIN — GABAPENTIN 600 MG: 300 CAPSULE ORAL at 20:08

## 2020-01-02 RX ADMIN — ACETAMINOPHEN 500 MG: 500 TABLET, FILM COATED ORAL at 18:23

## 2020-01-02 RX ADMIN — ATORVASTATIN CALCIUM 80 MG: 40 TABLET, FILM COATED ORAL at 20:07

## 2020-01-02 RX ADMIN — SODIUM CHLORIDE, PRESERVATIVE FREE 10 ML: 5 INJECTION INTRAVENOUS at 08:37

## 2020-01-02 RX ADMIN — SENNOSIDES AND DOCUSATE SODIUM 1 TABLET: 8.6; 5 TABLET ORAL at 20:07

## 2020-01-02 RX ADMIN — PANTOPRAZOLE SODIUM 40 MG: 40 TABLET, DELAYED RELEASE ORAL at 20:07

## 2020-01-02 RX ADMIN — SERTRALINE 150 MG: 100 TABLET, FILM COATED ORAL at 08:36

## 2020-01-02 RX ADMIN — LIDOCAINE 1 PATCH: 50 PATCH CUTANEOUS at 18:23

## 2020-01-02 RX ADMIN — OXYCODONE HYDROCHLORIDE AND ACETAMINOPHEN 1 TABLET: 10; 325 TABLET ORAL at 18:24

## 2020-01-02 RX ADMIN — OXYCODONE HYDROCHLORIDE AND ACETAMINOPHEN 1 TABLET: 10; 325 TABLET ORAL at 10:42

## 2020-01-02 NOTE — PROGRESS NOTES
[facility] Medicine Services  INPATIENT PROGRESS NOTE    Patient Name: Roberta Persaud  Date of Admission: 1/1/2020  Today's Date: 01/02/20  Length of Stay: 1  Primary Care Physician: Provider, No Known    Subjective   Chief Complaint: pelvic pain      HPI:      Patient seen and examined at bedside.  Patient denies that she had a bowel movement yesterday morning.  Patient indicates that she is now undergone a nurse visit facility, we discussed, and she now understands that she has to do this.  Her son is working with social work and try to find placement in [place].  Patient still indicates that her pain is not controlled, we have increased the frequency as well as the dosage of her pain medication.  I discussed with her my hesitancy to be too aggressive of the pain medication seeing that she is 88 years old, and likely does not metabolize drugs the same as other people do.  Patient indicates that her breathing is somewhat better, however she still does have rales on examination.        Review of Systems   Constitutional: Positive for activity change and fatigue. Negative for appetite change and chills.   Respiratory: Negative for cough and shortness of breath.    Cardiovascular: Negative for chest pain and palpitations.   Gastrointestinal: Negative for abdominal distention and diarrhea.   Musculoskeletal: Positive for arthralgias and back pain.        Pelvic pain   Neurological: Positive for weakness.        All pertinent negatives and positives are as above. All other systems have been reviewed and are negative unless otherwise stated.     Objective    Temp:  [97.6 °F (36.4 °C)-98.1 °F (36.7 °C)] 97.9 °F (36.6 °C)  Heart Rate:  [64-91] 69  Resp:  [16-18] 17  BP: (110-118)/(45-91) 118/52  Physical Exam   Constitutional: She is oriented to person, place, and time. No distress.   Son at bedside   HENT:   Head: Normocephalic and atraumatic.   Eyes: Conjunctivae are normal.  No scleral icterus.   Neck: Neck supple. No JVD present.   Cardiovascular: Normal rate and regular rhythm.   Murmur heard.  Pulmonary/Chest: Effort normal. She has no wheezes. She has rales.   Abdominal: Soft. Bowel sounds are normal. She exhibits no distension and no mass. There is no tenderness. There is no guarding.   Musculoskeletal: She exhibits no edema.   Neurological: She is alert and oriented to person, place, and time.   Skin: Skin is warm and dry. She is not diaphoretic. No erythema.   Psychiatric: She has a normal mood and affect. Her behavior is normal.   Tearful, anxious    Nursing note and vitals reviewed.          Results Review:  I have reviewed the labs, radiology results, and diagnostic studies.    Laboratory Data:   Results from last 7 days   Lab Units 01/02/20  0523 01/01/20  0151   WBC 10*3/mm3 8.75 17.27*   HEMOGLOBIN g/dL 9.3* 10.6*   HEMATOCRIT % 30.9* 35.3   PLATELETS 10*3/mm3 326 417        Results from last 7 days   Lab Units 01/02/20  0523 01/01/20  0151   SODIUM mmol/L 145 144   POTASSIUM mmol/L 4.3 4.0   CHLORIDE mmol/L 99 97*   CO2 mmol/L 36.0* 33.0*   BUN mg/dL 32* 35*   CREATININE mg/dL 1.57* 2.13*   CALCIUM mg/dL 8.5* 9.3   BILIRUBIN mg/dL  --  0.2   ALK PHOS U/L  --  40   ALT (SGPT) U/L  --  17   AST (SGOT) U/L  --  29   GLUCOSE mg/dL 102* 101*       Culture Data:   No results found for: BLOODCX, URINECX, WOUNDCX, MRSACX, RESPCX, STOOLCX    Radiology Data:   Imaging Results (Last 24 Hours)     ** No results found for the last 24 hours. **          I have reviewed the patient's current medications.     Assessment/Plan     Active Hospital Problems    Diagnosis   • Closed displaced fracture of pelvis (CMS/HCC)   • GERD (gastroesophageal reflux disease)   • Coronary artery disease   • Acute pulmonary edema (CMS/HCC)   • COPD (chronic obstructive pulmonary disease) (CMS/HCC)   • Acute kidney injury (CMS/HCC)   • Anemia       Plan:  1.  Enoxaparin 30 mg daily for DVT PPx - Would like to  switch to xarelto 10 if creatinine continues to improve   2.  Schedule tylenol 500 mg q6h  3.  One dose of ibuprofen 600 mg   4.  Increased morphine to 4 mg IV PRN   5.  Increase frequency of PO pain medication, percocet 10 mg q4h PRN   6.  Lidoderm patch  7.  Bowel regimen   8.  PT/OT  9.  SW consult for placement   10.  Furosemide 40 mg x 1 IV       Case discussed with bedside RN, son, and .      Weight-bearing status TTWB               Discharge Planning: I expect the patient to be discharged to SNF in ? days.    Sami Griffin MD   01/02/20   11:53 AM

## 2020-01-02 NOTE — DISCHARGE PLACEMENT REQUEST
"Please call DAVID Ramírez at [phone] after referral reviewed. Patient first inpt stay night was 1-1-20.   Thanks        Hung Robin Knowles (88 y.o. Female)     Date of Birth Social Security Number Address Home Phone MRN    05/31/1931  [address] [place] [address] [address] [place] [address] [phone] [id number]    Church Marital Status          [employer]        Admission Date Admission Type Admitting Provider Attending Provider Department, Room/Bed    1/1/20 Emergency Sami Griffin MD Fleming, John Eric, MD [facility] 3A, 355/2    Discharge Date Discharge Disposition Discharge Destination                       Attending Provider:  Sami Griffin MD    Allergies:  Bee Venom, Penicillins, Tetanus Toxoids    Isolation:  None   Infection:  None   Code Status:  CPR    Ht:  165.1 cm (65\")   Wt:  58.1 kg (128 lb)    Admission Cmt:  None   Principal Problem:  None                Active Insurance as of 1/1/2020     Primary Coverage     Payor Plan Insurance Group Employer/Plan Group    MEDICARE MEDICARE A & B      Payor Plan Address Payor Plan Phone Number Payor Plan Fax Number Effective Dates    [address] [phone]  5/1/1996 - None Entered    [facility] [id number]       Subscriber Name Subscriber Birth Date Member ID       ROBIN ESCUDERO 5/31/1931 [id number]           Secondary Coverage     Payor Plan Insurance Group Employer/Plan Group    [employer] [place] [employer] [place]      Payor Plan Address Payor Plan Phone Number Payor Plan Fax Number Effective Dates    [address] [place] [address] [phone]  5/1/1996 - None Entered    [place] [address]       Subscriber Name Subscriber Birth Date Member ID       ROBIN ESCUDERO 5/31/1931 [id number]                 Emergency Contacts      (Rel.) Home Phone Work Phone Mobile Phone    Km Escudero (Son) [phone] -- --            Insurance Information                MEDICARE/MEDICARE A & B Phone: [phone]    Subscriber: Hung, " "Roberta Knowles Subscriber#: [id number]    Group#:  Precert#:         [employer] Phone: [phone]    Subscriber: Roberta Persaud Subscriber#: [id number]    Group#:  Precert#:              History & Physical      Herberth Alvarado, DO at 01/01/20 0559              [facility] Medicine Services  HISTORY AND PHYSICAL    Date of Admission: 1/1/2020  Primary Care Physician: Provider, No Known    Subjective     Chief Complaint: Hip pain    History of Present Illness  88-year-old  female who presents to the emergency department after fall with hip pain.  Her grandson assist with giving history, and states that she had a big day yesterday and fell asleep on the toilet.  She states that she slipped off and landed on the ground.  She does state that she has chronic right leg problems and uses a wheelchair to get around.  She has a mid abdominal hernia.  She has chronic breathing problems and wears home oxygen.  She any acute bowel or kidney dysfunction.  The patient is from [place].  I did discuss with the patient's son if he wanted her to be in a facility for rehab in [place], and he states \" if you are not good to do anything all just take her back home to [place].\"        Review of Systems   Hip pain  Chronic shortness of breath with use of home oxygen    Otherwise complete ROS reviewed and negative except as mentioned in the HPI.    Past Medical History:   Past Medical History:   Diagnosis Date   • Arthritis    • COPD (chronic obstructive pulmonary disease) (CMS/[facility])    • Coronary artery disease    • GERD (gastroesophageal reflux disease)      Past Surgical History:  Past Surgical History:   Procedure Laterality Date   • CORONARY ARTERY BYPASS GRAFT  2001     Social History:  reports that she has quit smoking. She does not have any smokeless tobacco history on file. She reports that she does not drink alcohol or use drugs.    Family History: " CAD    Allergies:  Allergies   Allergen Reactions   • Bee Venom Anaphylaxis   • Penicillins Anaphylaxis   • Tetanus Toxoids Other (See Comments)     unknown     Medications:  Prior to Admission medications    Medication Sig Start Date End Date Taking? Authorizing Provider   tiotropium (SPIRIVA) 18 MCG per inhalation capsule Place 1 capsule into inhaler and inhale Daily.   Yes Ana Fagan MD   aspirin 81 MG EC tablet Take 81 mg by mouth Daily.    Ana Fagan MD   atenolol (TENORMIN) 25 MG tablet Take 25 mg by mouth Daily.    Ana Fagan MD   atorvastatin (LIPITOR) 80 MG tablet Take 80 mg by mouth Daily.    Ana Fagan MD   budesonide-formoterol (SYMBICORT) 160-4.5 MCG/ACT inhaler Inhale 2 puffs 2 (Two) Times a Day.    Ana Fagan MD   Calcium Carbonate-Vitamin D (CALCIUM-VITAMIN D) 500-200 MG-UNIT per tablet Take 2 tablets by mouth 2 (Two) Times a Day With Meals. 3/26/17   Maral Elise APRN   diphenhydrAMINE (BENADRYL) 25 mg capsule Take 25 mg by mouth Every 6 (Six) Hours As Needed for Itching.    Ana Fagan MD   furosemide (LASIX) 40 MG tablet Take 40 mg by mouth 2 (Two) Times a Day.    Ana Fagan MD   gabapentin (NEURONTIN) 300 MG capsule Take 600 mg by mouth Every Morning.    Ana Fagan MD   gabapentin (NEURONTIN) 300 MG capsule Take 600 mg by mouth Every Night.    Ana Fagan MD   isosorbide mononitrate (IMDUR) 60 MG 24 hr tablet Take 90 mg by mouth Daily.    Ana Fagan MD   ketotifen (ZADITOR) 0.025 % ophthalmic solution Administer 1 drop to both eyes Daily As Needed (for dry eyes).    Ana Fagan MD   MethylPREDNISolone (MEDROL, CARTER,) 4 MG tablet Take as directed on package instructions. 3/26/17   Maral Elise APRN   MethylPREDNISolone (MEDROL, CARTER,) 4 MG tablet Take as directed on package instructions. 3/27/17   Maral Elise APRN   MethylPREDNISolone (MEDROL, CARTER,) 4 MG tablet  "Take as directed on package instructions. 3/28/17   Maral Elise, CARY   MethylPREDNISolone (MEDROL, CARTER,) 4 MG tablet Take as directed on package instructions. 3/29/17   Maral Elise, APRN   MethylPREDNISolone (MEDROL, CARTER,) 4 MG tablet Take as directed on package instructions. 3/30/17   Maral Elise, APRN   MethylPREDNISolone (MEDROL, CARTER,) 4 MG tablet Take as directed on package instructions. 3/26/17   Maral Elise APRN   naproxen (NAPROSYN) 250 MG tablet Take 250 mg by mouth 2 (Two) Times a Day As Needed for Mild Pain (1-3).    Ana Fagan MD   NITROGLYCERIN SL Take 8.4 mg by mouth 1 (One) Time As Needed for Chest Pain. Take no more than 3 doses in 15 minutes.     Ana Fagan MD   omeprazole (priLOSEC) 40 MG capsule Take 40 mg by mouth 2 (Two) Times a Day.    Ana Fagan MD   oxyCODONE-acetaminophen (PERCOCET)  MG per tablet Take 1 tablet by mouth Every 6 (Six) Hours As Needed for Moderate Pain (4-6).    Ana Fagan MD   senna (SENNA LAX) 8.6 MG tablet Take 1 tablet by mouth Daily As Needed for Constipation.    Ana Fagan MD   sertraline (ZOLOFT) 100 MG tablet Take 150 mg by mouth Daily.    Ana Fagan MD     Objective     Vital Signs: /43   Pulse 71   Temp 98.4 °F (36.9 °C) (Oral)   Resp 18   Ht 162.6 cm (64\")   Wt 57.6 kg (127 lb)   SpO2 97%   BMI 21.80 kg/m²    Physical Exam   Constitutional:   Thin and frail   HENT:   Head: Normocephalic and atraumatic.   Nose: Nose normal.   Mouth/Throat: Oropharynx is clear and moist.   Eyes: Conjunctivae and EOM are normal.   Neck: Normal range of motion. Neck supple.   Cardiovascular: Normal rate, regular rhythm and normal heart sounds.   Pulmonary/Chest: Effort normal and breath sounds normal.   Mid xiphoid hernia, nontender to palpation   Abdominal: Soft. Bowel sounds are normal.   Musculoskeletal: She exhibits no edema or tenderness.   Neurological: She is alert. No " cranial nerve deficit.   Skin: Skin is warm and dry.   Psychiatric: She has a normal mood and affect.   Vitals reviewed.          Results Reviewed:  Lab Results (last 24 hours)     Procedure Component Value Units Date/Time    BNP [[id number]]  (Abnormal) Collected:  01/01/20 0151    Specimen:  Blood Updated:  01/01/20 0305     proBNP 2,628.0 pg/mL     Narrative:       Among patients with dyspnea, NT-proBNP is highly sensitive for the detection of acute congestive heart failure. In addition NT-proBNP of <300 pg/ml effectively rules out acute congestive heart failure with 99% negative predictive value.      Comprehensive Metabolic Panel [[id number]]  (Abnormal) Collected:  01/01/20 0151    Specimen:  Blood Updated:  01/01/20 0222     Glucose 101 mg/dL      BUN 35 mg/dL      Creatinine 2.13 mg/dL      Sodium 144 mmol/L      Potassium 4.0 mmol/L      Comment: Specimen hemolyzed.  Results may be affected.        Chloride 97 mmol/L      CO2 33.0 mmol/L      Calcium 9.3 mg/dL      Total Protein 7.2 g/dL      Albumin 4.30 g/dL      ALT (SGPT) 17 U/L      Comment: Specimen hemolyzed.  Results may be affected.        AST (SGOT) 29 U/L      Comment: Specimen hemolyzed.  Results may be affected.        Alkaline Phosphatase 40 U/L      Total Bilirubin 0.2 mg/dL      eGFR Non African Amer 22 mL/min/1.73      Globulin 2.9 gm/dL      A/G Ratio 1.5 g/dL      BUN/Creatinine Ratio 16.4     Anion Gap 14.0 mmol/L     Narrative:       GFR Normal >60  Chronic Kidney Disease <60  Kidney Failure <15      Troponin [[id number]]  (Normal) Collected:  01/01/20 0151    Specimen:  Blood Updated:  01/01/20 0218     Troponin T 0.017 ng/mL     Narrative:       Troponin T Reference Range:  <= 0.03 ng/mL-   Negative for AMI  >0.03 ng/mL-     Abnormal for myocardial necrosis.  Clinicians would have to utilize clinical acumen, EKG, Troponin and serial changes to determine if it is an Acute Myocardial Infarction or myocardial injury due to an underlying  chronic condition.     aPTT [[id number]]  (Normal) Collected:  01/01/20 0151    Specimen:  Blood Updated:  01/01/20 0209     PTT 29.1 seconds     Protime-INR [[id number]]  (Abnormal) Collected:  01/01/20 0151    Specimen:  Blood Updated:  01/01/20 0209     Protime 11.8 Seconds      INR 0.84    CBC & Differential [[id number]] Collected:  01/01/20 0151    Specimen:  Blood Updated:  01/01/20 0204    Narrative:       The following orders were created for panel order CBC & Differential.  Procedure                               Abnormality         Status                     ---------                               -----------         ------                     CBC Auto Differential[[id number]]        Abnormal            Final result                 Please view results for these tests on the individual orders.    CBC Auto Differential [[id number]]  (Abnormal) Collected:  01/01/20 0151    Specimen:  Blood Updated:  01/01/20 0204     WBC 17.27 10*3/mm3      RBC 3.88 10*6/mm3      Hemoglobin 10.6 g/dL      Hematocrit 35.3 %      MCV 91.0 fL      MCH 27.3 pg      MCHC 30.0 g/dL      RDW 16.4 %      RDW-SD 54.4 fl      MPV 11.4 fL      Platelets 417 10*3/mm3      Neutrophil % 83.3 %      Lymphocyte % 6.8 %      Monocyte % 6.3 %      Eosinophil % 1.9 %      Basophil % 0.5 %      Immature Grans % 1.2 %      Neutrophils, Absolute 14.40 10*3/mm3      Lymphocytes, Absolute 1.17 10*3/mm3      Monocytes, Absolute 1.09 10*3/mm3      Eosinophils, Absolute 0.32 10*3/mm3      Basophils, Absolute 0.09 10*3/mm3      Immature Grans, Absolute 0.20 10*3/mm3      nRBC 0.0 /100 WBC         Imaging Results (Last 24 Hours)     Procedure Component Value Units Date/Time    CT Head Without Contrast [[id number]] Resulted:  01/01/20 0256     Updated:  01/01/20 0338    CT Cervical Spine Without Contrast [[id number]] Resulted:  01/01/20 0256     Updated:  01/01/20 0338    CT Thoracic Spine Without Contrast [[id number]] Resulted:  01/01/20 0256     Updated:  01/01/20  0338    XR Chest 1 View [[id number]] Resulted:  01/01/20 0235     Updated:  01/01/20 0235    XR Hip With or Without Pelvis 2 - 3 View Right [[id number]]  (Abnormal) Resulted:  01/01/20 0234     Updated:  01/01/20 0234        I have personally reviewed and interpreted the radiology studies and ECG obtained at time of admission.     Assessment / Plan     Assessment:   Active Hospital Problems    Diagnosis   • Closed displaced fracture of pelvis (CMS/[facility])     Impression:  1.  Pelvic fracture  2.  COPD, oxygen dependent not acute exacerbation  3.  Renal insufficiency, the patient's last creatinine on 2/24/2019 was 1.3  4.  Nonspecific anemia    Plan:   1.  Consult orthopedics  2.  PT OT consult  3.  Resume home medications  4.  Pain control  5.  Labs in the morning  6.  O2 support      Code Status: Full     I discussed the patient's findings and my recommendations with the patient and family at bedside    Estimated length of stay overnight    Herberth Alvarado DO   01/01/20   5:59 AM              Electronically signed by Herberth Alvarado DO at 01/01/20 0604       Operative/Procedure Notes (last 7 days) (Notes from 12/26/19 1149 through 01/02/20 1149)    No notes of this type exist for this encounter.            Physician Progress Notes (last 24 hours) (Notes from 01/01/20 1149 through 01/02/20 1149)      Sami Griffin MD at 01/01/20 1533              [facility] Medicine Services  INPATIENT PROGRESS NOTE    Patient Name: Roberta Persaud  Date of Admission: 1/1/2020  Today's Date: 01/01/20  Length of Stay: 0  Primary Care Physician: Provider, No Known    Subjective   Chief Complaint: pelvic pain   HPI     Patient seen and examined at bedside. Patient has worsening shortness of breath, started PRN duonebs for her and gave a dose of lasix as her CXR looks volume overloaded per my interpretation.  Patient complains of 10/10 pain and indicates the PO medication is not working.          Review of Systems   Constitutional: Positive for activity change and fatigue. Negative for appetite change, chills and fever.   Respiratory: Positive for shortness of breath. Negative for cough.    Cardiovascular: Negative for chest pain and palpitations.   Gastrointestinal: Negative for abdominal distention, abdominal pain, diarrhea, nausea and vomiting.   Musculoskeletal: Positive for back pain.        Pelvic pain   Neurological: Positive for weakness.        All pertinent negatives and positives are as above. All other systems have been reviewed and are negative unless otherwise stated.     Objective    Temp:  [97.5 °F (36.4 °C)-98.4 °F (36.9 °C)] 97.5 °F (36.4 °C)  Heart Rate:  [70-91] 91  Resp:  [16-18] 18  BP: (105-139)/(43-80) 110/48  Physical Exam   Constitutional: She is oriented to person, place, and time. No distress.   Appears uncomfortable and in pain    HENT:   Head: Normocephalic and atraumatic.   Eyes: Conjunctivae are normal. No scleral icterus.   Neck: Neck supple. No JVD present.   Cardiovascular: Normal rate and regular rhythm.   Murmur heard.  Pulmonary/Chest: Effort normal. She has no wheezes. She has rales.   Abdominal: Soft. Bowel sounds are normal. She exhibits no distension and no mass. There is no tenderness. There is no guarding.   Musculoskeletal: She exhibits no edema.   Neurological: She is alert and oriented to person, place, and time.   Skin: Skin is warm and dry. She is not diaphoretic. No erythema.   Psychiatric: She has a normal mood and affect. Her behavior is normal.   Nursing note and vitals reviewed.          Results Review:  I have reviewed the labs, radiology results, and diagnostic studies.    Laboratory Data:   Results from last 7 days   Lab Units 01/01/20  0151   WBC 10*3/mm3 17.27*   HEMOGLOBIN g/dL 10.6*   HEMATOCRIT % 35.3   PLATELETS 10*3/mm3 417        Results from last 7 days   Lab Units 01/01/20  0151   SODIUM mmol/L 144   POTASSIUM mmol/L 4.0   CHLORIDE  mmol/L 97*   CO2 mmol/L 33.0*   BUN mg/dL 35*   CREATININE mg/dL 2.13*   CALCIUM mg/dL 9.3   BILIRUBIN mg/dL 0.2   ALK PHOS U/L 40   ALT (SGPT) U/L 17   AST (SGOT) U/L 29   GLUCOSE mg/dL 101*       Culture Data:   No results found for: BLOODCX, URINECX, WOUNDCX, MRSACX, RESPCX, STOOLCX    Radiology Data:   Imaging Results (Last 24 Hours)     Procedure Component Value Units Date/Time    XR Chest 1 View [[id number]] Collected:  01/01/20 0749     Updated:  01/01/20 0753    Narrative:       EXAMINATION:  XR CHEST 1 VW-  1/1/2020 2:10 AM CST     HISTORY: Chest pain.     COMPARISON: 03/24/2017.     FINDINGS:  There is cardiomegaly with vascular redistribution. There are  interstitial infiltrates. There is chronic bronchial wall thickening.  There has been prior median sternotomy.       Impression:       1. Cardiomegaly with vascular redistribution.  2. Interstitial infiltrates, likely edema.  3. Stable chronic bronchial wall thickening.        This report was finalized on 01/01/2020 07:50 by Dr. Rodrick Byrne MD.    XR Hip With or Without Pelvis 2 - 3 View Right [[id number]] Collected:  01/01/20 0747     Updated:  01/01/20 0752    Narrative:       EXAMINATION:  XR HIP W OR WO PELVIS 2-3 VIEW RIGHT-  1/1/2020 2:10 AM  CST     HISTORY: The patient fell. Right hip pain.     COMPARISON: No comparison study.     TECHNIQUE: AP and crosstable lateral views of the right hip were  supplemented with an AP image of the pelvis.     FINDINGS: There is a comminuted acute fracture of the superior pubic  ramus on the right. There is also an inferior pubic ramus fracture on  the right. There has been prior internal repair of a right proximal  femur fracture that appears to be well healed.       Impression:       1. Acute fractures of the superior and inferior pubic rami on the right  side.  2. Chronic proximal right femur fracture with internal repair.  This report was finalized on 01/01/2020 07:49 by Dr. Rodrick Byrne MD.    CT Thoracic  Spine Without Contrast [[id number]] Collected:  01/01/20 0734     Updated:  01/01/20 0744    Narrative:       EXAMINATION:  CT THORACIC SPINE WO CONTRAST-  1/1/2020 2:56 AM CST     HISTORY: The patient fell. Mid back pain.     TECHNIQUE: Spiral CT was performed of the thoracic spine. Sagittal and  coronal images were reconstructed.     DLP: 850 mGy-cm. Automated dosage control was utilized.     COMPARISON: No comparison study.     FINDINGS: There is atheromatous disease of the thoracic aorta and  coronary arteries. The pulmonary arteries are dilated. There are  compression fractures at T4, T6 and T9. The fractures at T4 and T6 are  probably chronic. There has been prior kyphoplasty at T9. There is  chronic compression deformity of L2 that is only partially included on  this study. There has been kyphoplasty at L2. No acute appearing  fracture is identified. There is disc degeneration at multiple thoracic  levels. There are degenerative changes in the visualized cervical spine  most severe at C4-5 and C5-6. Please see the cervical spine CT report  separately.       Impression:       1. Thoracic spine compression deformities, as described.  2. Degenerative disc disease at multiple levels.  3. Atheromatous disease of the thoracic aorta and coronary arteries.  Dilated pulmonary arteries.  This report was finalized on 01/01/2020 07:41 by Dr. Rodrick Byrne MD.    CT Cervical Spine Without Contrast [[id number]] Collected:  01/01/20 0729     Updated:  01/01/20 0737    Narrative:       EXAMINATION:  CT CERVICAL SPINE WO CONTRAST-  1/1/2020 2:56 AM CST     HISTORY: The patient fell. Neck pain.     TECHNIQUE: Spiral CT was performed of the cervical spine. Sagittal and  coronal images were reconstructed.     DLP: 202 mGy-cm. Automated dosage control was utilized.     COMPARISON: No comparison study.     FINDINGS: There is no evidence of cervical spine fracture. There is  severe narrowing of the space between the anterior arch of  C1 and the  dens. There is an os odontoideum. There is calcification of the carotid  arteries in the neck bilaterally. The bones are demineralized.     At C2-3, the disc is fairly well-maintained. There is facet arthropathy.  There is no spinal or foraminal narrowing.     At C3-4, there is disc narrowing with spondylitic and uncinate spurring.  There is facet arthropathy and minimal anterior subluxation of C3  compared to C4. There is mild narrowing of the central spinal canal.  There is mild to moderate bilateral foraminal narrowing.     At C4-5, there is disc narrowing with spondylitic and uncinate spurring  producing dural sac compression. There is mild narrowing of the central  canal. There is facet arthropathy on the right. There is severe right  and mild left-sided foraminal stenosis.     At C5-6, there is disc narrowing with spondylitic and uncinate spurring.  There is facet arthropathy. There is mild to moderate bilateral  foraminal stenosis. There is mild narrowing of the central canal.     At C6-7, there is disc narrowing with spondylitic and uncinate spurring.  There is mild narrowing of the central spinal canal. There is mild to  moderate bilateral foraminal stenosis.       Impression:       1. No evidence of cervical spine fracture.  2. Degenerative changes, as described.     This report was finalized on 01/01/2020 07:34 by Dr. Rodrick Byrne MD.    CT Head Without Contrast [[id number]] Collected:  01/01/20 0723     Updated:  01/01/20 0728    Narrative:       EXAMINATION:  CT HEAD WO CONTRAST-  1/1/2020 2:56 AM CST     HISTORY: The patient fell. Head injury.     TECHNIQUE: Multiple axial images were obtained through the brain without  contrast infusion. Multiplanar images were reconstructed.     DLP: 551 mGy-cm. Automated dosage control was utilized.     COMPARISON: 03/24/2017.     FINDINGS: There are no hemorrhage, edema or mass effect. There is mild  to moderate atrophy with associated ventricular  prominence. There is low  density in the hemispheric white matter. There is bilateral basal  ganglia and cerebellar calcification. Vascular calcification is noted.  The visualized paranasal sinuses and mastoid air cells are clear.       Impression:       1. No acute hemorrhage, edema or mass effect.  2. Atrophy and chronic ischemic white matter disease, stable.     This report was finalized on 01/01/2020 07:25 by Dr. Rodrick Byrne MD.          I have reviewed the patient's current medications.     Assessment/Plan     Active Hospital Problems    Diagnosis   • Closed displaced fracture of pelvis (CMS/HCC)   • GERD (gastroesophageal reflux disease)   • Coronary artery disease   • Acute pulmonary edema (CMS/HCC)   • COPD (chronic obstructive pulmonary disease) (CMS/HCC)   • Acute kidney injury (CMS/[facility])   • Anemia       Plan:  1.  Start xarelto 10 mg for DVT PPx, patient has a history of blood clot in leg after cardiac surgery  2.  PT/OT  3.  Duonebs added   4.  Morphine 2 mg IV PRN and PRN percocet   5.  Furosemide 40 mg IV x 1   6.  CXR personally reviewed and shows pulmonary edema   7.  Monitor fluid status closely  8.  May need placement    9.  AM CBC and BMP       Case discussed with bedside RN       Weight-bearing status TTWB               Discharge Planning: I expect the patient to be discharged to ? in ? days.    Sami Griffin MD   01/01/20   3:33 PM    Electronically signed by Sami Griffin MD at 01/01/20 2012       Consult Notes (last 24 hours) (Notes from 01/01/20 1150 through 01/02/20 1150)    No notes of this type exist for this encounter.            Physical Therapy Notes (last 24 hours) (Notes from 01/01/20 1150 through 01/02/20 1150)      David Phipps, PT at 01/01/20 6562  Version 1 of 1         Problem: Patient Care Overview  Goal: Plan of Care Review  Outcome: Ongoing (interventions implemented as appropriate)  Flowsheets (Taken 1/1/2020 1300)  Plan of Care Reviewed With:  patient;son  Outcome Summary: PT IE complete.  Mod assist x2 for bed mobility and standing bedside.  TTWB RLE w/ RW.  9/10 pain R side of pelvis.  91% w/ 2.5L O2 per NC.  Recommend home health vs. transitional care at [facility].  Thank you for referral.       Electronically signed by David Phipps, PT at 20 [phone]     David Phipps, PT at 20 [phone]  Version 1 of 1         Patient Name: Roberta Persaud  : 1931    MRN: [id number]                              Today's Date: 2020       Admit Date: 2020    Visit Dx:     ICD-10-CM ICD-9-CM   1. Closed displaced fracture of pelvis, unspecified part of pelvis, initial encounter (CMS/[facility]) S32.9XXA 808.8   2. KASANDRA (acute kidney injury) (CMS/[facility]) N17.9 584.9   3. Leukocytosis, unspecified type D72.829 288.60   4. Impaired mobility Z74.09 799.89     Patient Active Problem List   Diagnosis   • Fever in adult   • Altered mental status   • Closed displaced fracture of pelvis (CMS/formerly [facility])     Past Medical History:   Diagnosis Date   • Arthritis    • COPD (chronic obstructive pulmonary disease) (CMS/formerly [facility])    • Coronary artery disease    • GERD (gastroesophageal reflux disease)      Past Surgical History:   Procedure Laterality Date   • CORONARY ARTERY BYPASS GRAFT       General Information     Row Name 20 1300          PT Evaluation Time/Intention    Document Type  evaluation s/p fall off toliet, Dx:  R pelvic fxs  -     Mode of Treatment  physical therapy  -     Row Name 20 1300          General Information    Patient Profile Reviewed?  yes non op treatement per ortho  -     Prior Level of Function  independent:;all household mobility;community mobility;ADL's;dependent:;driving uses RW, O2 at home  -     Existing Precautions/Restrictions  fall;oxygen therapy device and L/min TTWB RLE  -LH     Barriers to Rehab  none identified  -     Row Name 20 1300          Relationship/Environment    Lives With  alone son and brother check in daily  on pt  -     Row Name 01/01/20 1300          Resource/Environmental Concerns    Current Living Arrangements  home/apartment/condo tub/shower w/ grab bars and bath bench  -     Row Name 01/01/20 1300          Home Main Entrance    Number of Stairs, Main Entrance  -- ramp  -     Row Name 01/01/20 1300          Cognitive Assessment/Intervention- PT/OT    Orientation Status (Cognition)  oriented x 4  -     Row Name 01/01/20 1300          Safety Issues, Functional Mobility    Safety Issues Affecting Function (Mobility)  ability to follow commands  -     Impairments Affecting Function (Mobility)  balance;endurance/activity tolerance;pain;strength  -       User Key  (r) = Recorded By, (t) = Taken By, (c) = Cosigned By    Initials Name Provider Type     David Phipps, PT Physical Therapist        Mobility     Row Name 01/01/20 1300          Bed Mobility Assessment/Treatment    Bed Mobility Assessment/Treatment  supine-sit;sit-supine;scooting/bridging  -     Scooting/Bridging [place] (Bed Mobility)  verbal cues;moderate assist (50% patient effort);2 person assist  -     Supine-Sit [place] (Bed Mobility)  verbal cues;moderate assist (50% patient effort);2 person assist  -     Sit-Supine [place] (Bed Mobility)  verbal cues;moderate assist (50% patient effort);2 person assist  -     Assistive Device (Bed Mobility)  draw sheet;head of bed elevated  -[facility] Name 01/01/20 1300          Sit-Stand Transfer    Sit-Stand L[place] (Transfers)  verbal cues;moderate assist (50% patient effort);2 person assist stood bedside w/ RW TTWB RLE  -     Row Name 01/01/20 1300          Gait/Stairs Assessment/Training    Luce Level (Gait)  unable to assess  -[facility] Name 01/01/20 1300          Mobility Assessment/Intervention    Extremity Weight-bearing Status  right lower extremity  -     Right Lower Extremity (Weight-bearing Status)  (S) toe touch weight-bearing (TTWB)  -       User  Key  (r) = Recorded By, (t) = Taken By, (c) = Cosigned By    Initials Name Provider Type     David Phipps, PT Physical Therapist        Obj/Interventions     Row Name 01/01/20 1300          General ROM    GENERAL ROM COMMENTS  WFL  -[facility] Name 01/01/20 1300          MMT (Manual Muscle Testing)    General MMT Comments  functionally 4-/5  -     Row Name 01/01/20 1300          Static Sitting Balance    Level of [place] (Unsupported Sitting, Static Balance)  standby assist  -     Sitting Position (Unsupported Sitting, Static Balance)  sitting on edge of bed  -     Row Name 01/01/20 1300          Static Standing Balance    Level of [place] (Supported Standing, Static Balance)  minimal assist, 75% patient effort  -     Assistive Device Utilized (Supported Standing, Static Balance)  walker, rolling  -[facility] Name 01/01/20 1300          Sensory Assessment/Intervention    Sensory General Assessment  no sensation deficits identified  -       User Key  (r) = Recorded By, (t) = Taken By, (c) = Cosigned By    Initials Name Provider Type     David Phipps, PT Physical Therapist        Goals/Plan     [facility] Name 01/01/20 1300          Bed Mobility Goal 1 (PT)    Activity/Assistive Device (Bed Mobility Goal 1, PT)  bed mobility activities, all  -     [place] Level/Cues Needed (Bed Mobility Goal 1, PT)  standby assist  -     Time Frame (Bed Mobility Goal 1, PT)  by discharge  -     Progress/Outcomes (Bed Mobility Goal 1, PT)  goal ongoing  -[facility] Name 01/01/20 1300          Transfer Goal 1 (PT)    Activity/Assistive Device (Transfer Goal 1, PT)  sit-to-stand/stand-to-sit;bed-to-chair/chair-to-bed;walker, rolling;car transfer  -     [place] Level/Cues Needed (Transfer Goal 1, PT)  standby assist  -     Time Frame (Transfer Goal 1, PT)  by discharge  -     Barriers (Transfers Goal 1, PT)  TTWB RLE  -     Progress/Outcome (Transfer Goal 1, PT)  goal ongoing  -       User Key   (r) = Recorded By, (t) = Taken By, (c) = Cosigned By    Initials Name Provider Type     David Phipps, PT Physical Therapist        Clinical Impression     Row Name 01/01/20 1300          Pain Assessment    Additional Documentation  Pain Scale: Numbers Pre/Post-Treatment (Group)  -     Row Name 01/01/20 1300          Pain Scale: Numbers Pre/Post-Treatment    Pain Scale: Numbers, Pretreatment  9/10  -LH     Pain Scale: Numbers, Post-Treatment  9/10  -LH     Pain Location - Side  Right  -LH     Pain Location  hip  -LH     Pain Intervention(s)  Medication (See MAR);Repositioned;Ambulation/increased activity  -     Row Name 01/01/20 1300          Plan of Care Review    Plan of Care Reviewed With  patient;son  -     Outcome Summary  PT IE complete.  Mod assist x2 for bed mobility and standing bedside.  TTWB RLE w/ RW.  9/10 pain R side of pelvis.  91% w/ 2.5L O2 per NC.  Recommend home health vs. transitional care at [place].  Thank you for referral.  -     Row Name 01/01/20 1300          Physical Therapy Clinical Impression    Patient/Family Goals Statement (PT Clinical Impression)  return home  -     Criteria for Skilled Interventions Met (PT Clinical Impression)  yes;treatment indicated  -     Rehab Potential (PT Clinical Summary)  good, to achieve stated therapy goals  -     Predicted Duration of Therapy (PT)  until dc  -     Row Name 01/01/20 1300          Vital Signs    Intra SpO2 (%)  91  -LH     O2 Delivery Intra Treatment  supplemental O2  -     Intra Patient Position  Sitting  -     Row Name 01/01/20 1300          Positioning and Restraints    Pre-Treatment Position  in bed  -     Post Treatment Position  bed  -     In Bed  fowlers;call light within reach;encouraged to call for assist;exit alarm on;with family/caregiver;side rails up x2  -       User Key  (r) = Recorded By, (t) = Taken By, (c) = Cosigned By    Initials Name Provider Type     David Phipps, PT Physical Therapist         Outcome Measures     Row Name 01/01/20 1354          How much help from another person do you currently need...    Turning from your back to your side while in flat bed without using bedrails?  2  -LH     Moving from lying on back to sitting on the side of a flat bed without bedrails?  2  -LH     Moving to and from a bed to a chair (including a wheelchair)?  2  -LH     Standing up from a chair using your arms (e.g., wheelchair, bedside chair)?  2  -LH     Climbing 3-5 steps with a railing?  1  -LH     To walk in hospital room?  1  -     AM-PAC 6 Clicks Score (PT)  10  -     Row Name 01/01/20 1354          Functional Assessment    Outcome Measure Options  AM-PAC 6 Clicks Basic Mobility (PT)  -       User Key  (r) = Recorded By, (t) = Taken By, (c) = Cosigned By    Initials Name Provider Type    David Manzano, PT Physical Therapist          PT Recommendation and Plan  Planned Therapy Interventions (PT Eval): bed mobility training, home exercise program, patient/family education, strengthening, transfer training  Outcome Summary/Treatment Plan (PT)  Anticipated Discharge Disposition (PT): home with home health, transitional care  Plan of Care Reviewed With: patient, son  Outcome Summary: PT IE complete.  Mod assist x2 for bed mobility and standing bedside.  TTWB RLE w/ RW.  9/10 pain R side of pelvis.  91% w/ 2.5L O2 per NC.  Recommend home health vs. transitional care at [place].  Thank you for referral.     Time Calculation:   PT Charges     Row Name 01/01/20 1196             Time Calculation    Start Time  1300 10 min this am reviewing chart  -      Stop Time  1345  -      Time Calculation (min)  45 min  -      PT Received On  01/01/20  -      PT Goal Re-Cert Due Date  01/11/20  -        User Key  (r) = Recorded By, (t) = Taken By, (c) = Cosigned By    Initials Name Provider Type    David Manzano, PT Physical Therapist        Therapy Charges for Today     Code Description Service Date Service  Provider Modifiers Qty    20734828720 HC PT EVAL LOW COMPLEXITY 4 2020 David Phipps, PT GP 1          PT G-Codes  Outcome Measure Options: AM-PAC 6 Clicks Basic Mobility (PT)  AM-PAC 6 Clicks Score (PT): 10    David Phipps, RILEY  2020         Electronically signed by David Phipps, PT at 20 1358          Occupational Therapy Notes (last 24 hours) (Notes from 20 1150 through 20 1150)      Sofy Mesa, OTR/L at 20 1417          Acute Care - Occupational Therapy Initial Evaluation  [facility]     Patient Name: Roberta Persaud  : 1931  MRN: [id number]  Today's Date: 2020  Onset of Illness/Injury or Date of Surgery: 19  Date of Referral to OT: 20  Referring Physician: Dr. Alvarado    Admit Date: 2020       ICD-10-CM ICD-9-CM   1. Closed displaced fracture of pelvis, unspecified part of pelvis, initial encounter (CMS/[facility]) S32.9XXA 808.8   2. KASANDRA (acute kidney injury) (CMS/[facility]) N17.9 584.9   3. Leukocytosis, unspecified type D72.829 288.60   4. Impaired mobility Z74.09 799.89   5. Impaired mobility and ADLs Z74.09 799.89     Patient Active Problem List   Diagnosis   • Fever in adult   • Altered mental status   • Closed displaced fracture of pelvis (CMS/[facility])     Past Medical History:   Diagnosis Date   • Arthritis    • COPD (chronic obstructive pulmonary disease) (CMS/[facility])    • Coronary artery disease    • GERD (gastroesophageal reflux disease)      Past Surgical History:   Procedure Laterality Date   • CORONARY ARTERY BYPASS GRAFT            OT ASSESSMENT FLOWSHEET (last 12 hours)      Occupational Therapy Evaluation     Row Name 20 1315                   OT Evaluation Time/Intention    Subjective Information  complains of;pain  -MW        Document Type  evaluation  -MW        Mode of Treatment  occupational therapy  -MW           General Information    Patient Profile Reviewed?  yes  -MW        Onset of Illness/Injury or Date of Surgery  19   -MW        Referring Physician  Dr. Alvarado  -        Patient Observations  alert;cooperative;agree to therapy  -MW        Patient/Family Observations  son present  -MW        General Observations of Patient  asleep but arouses to name, 2.5L O2 nc  -MW        Prior Level of Function  independent:;all household mobility;community mobility;ADL's;dependent:;driving  -MW        Equipment Currently Used at Home  bath bench;rollator;wheelchair;oxygen;ramp 2.5L O2 at all times  -MW        Pertinent History of Current Functional Problem  s/p fall off toliet, Dx:  R pelvic fxs  -MW        Existing Precautions/Restrictions  fall;oxygen therapy device and L/min TTWB RLE  -MW           Relationship/Environment    Primary Source of Support/Comfort  child(corina)  -MW        Lives With  alone son and brother check in daily  -MW           Resource/Environmental Concerns    Current Living Arrangements  home/apartment/condo  -MW           Cognitive Assessment/Interventions    Additional Documentation  Cognitive Assessment/Intervention (Group)  -           Cognitive Assessment/Intervention- PT/OT    Affect/Mental Status (Cognitive)  WFL  -MW        Orientation Status (Cognition)  oriented x 4  -MW        Follows Commands (Cognition)  WFL  -MW        Cognitive Function (Cognitive)  WFL  -MW        Personal Safety Interventions  fall prevention program maintained;gait belt;muscle strengthening facilitated;nonskid shoes/slippers when out of bed;supervised activity  -MW           Safety Issues, Functional Mobility    Impairments Affecting Function (Mobility)  balance;endurance/activity tolerance;pain;strength  -MW           Mobility Assessment/Treatment    Extremity Weight-bearing Status  right lower extremity  -MW        Right Lower Extremity (Weight-bearing Status)  (S) toe touch weight-bearing (TTWB) with ability to auto protect PRN  -MW           Bed Mobility Assessment/Treatment    Bed Mobility Assessment/Treatment   supine-sit;sit-supine;scooting/bridging  -MW        Scooting/Bridging [place] (Bed Mobility)  verbal cues;moderate assist (50% patient effort);2 person assist  -MW        Supine-Sit [place] (Bed Mobility)  verbal cues;moderate assist (50% patient effort);2 person assist  -MW        Sit-Supine [place] (Bed Mobility)  verbal cues;moderate assist (50% patient effort);2 person assist  -MW        Assistive Device (Bed Mobility)  draw sheet;head of bed elevated  -MW           Functional Mobility    Functional Mobility- Comment  unable to perform, unable to maintain TTWB and limited by pain  -MW           Transfer Assessment/Treatment    Transfer Assessment/Treatment  sit-stand transfer  -MW           Sit-Stand Transfer    Sit-Stand [place] (Transfers)  verbal cues;moderate assist (50% patient effort);2 person assist  -MW           ADL Assessment/Intervention    BADL Assessment/Intervention  lower body dressing  -MW           Lower Body Dressing Assessment/Training    Lower Body Dressing [place] Level  don;socks;dependent (less than 25% patient effort)  -MW        Lower Body Dressing Position  edge of bed sitting  -MW           BADL Safety/Performance    Impairments, BADL Safety/Performance  balance;endurance/activity tolerance;pain;strength  -MW           General ROM    GENERAL ROM COMMENTS  AROM WFL BUE  -MW           MMT (Manual Muscle Testing)    General MMT Comments  BUE functionally 4-/5  -MW           Motor Assessment/Interventions    Additional Documentation  Balance (Group)  -MW           Balance    Balance  static sitting balance;static standing balance  -           Static Sitting Balance    Level of [place] (Unsupported Sitting, Static Balance)  standby assist  -MW        Sitting Position (Unsupported Sitting, Static Balance)  sitting on edge of bed  -           Static Standing Balance    Level of [place] (Supported Standing, Static Balance)  minimal assist, 75% patient  effort  -MW        Assistive Device Utilized (Supported Standing, Static Balance)  walker, rolling  -MW           Sensory Assessment/Intervention    Sensory General Assessment  no sensation deficits identified  -MW           Positioning and Restraints    Pre-Treatment Position  in bed  -MW        Post Treatment Position  bed  -MW        In Bed  fowlers;call light within reach;encouraged to call for assist;exit alarm on;side rails up x2;with family/caregiver  -MW           Pain Assessment    Additional Documentation  Pain Scale: Numbers Pre/Post-Treatment (Group)  -MW           Pain Scale: Numbers Pre/Post-Treatment    Pain Scale: Numbers, Pretreatment  9/10  -MW        Pain Scale: Numbers, Post-Treatment  9/10  -MW        Pain Location - Side  Right  -MW        Pain Location  hip  -MW        Pain Intervention(s)  Medication (See MAR);Repositioned;Ambulation/increased activity  -MW           Plan of Care Review    Plan of Care Reviewed With  patient;son  -MW        Outcome Summary  OT eval completed. Pt significantly limited by pain, rates 9/10 throughout eval. ModAx2 for all bed mobility and to come to stand. Unable to maintain TTWB to take steps at this time. Max-dep LB dressing and hygiene following incontinence. Pt reports she has been unable to control bladder when coughing and has increased tremors in RUE since injury. OT indicated to address ADL, transfer, and functional mobility to increase independence and safety. Pt and family plan for her to return home at d/c, recommend  vs transitional care for continued rehab.   -MW           Clinical Impression (OT)    Date of Referral to OT  01/01/20  -MW        OT Diagnosis  decreased ADL  -MW        Prognosis (OT Eval)  good  -MW        Patient/Family Goals Statement (OT Eval)  return home w assist from son  -MW        Criteria for Skilled Therapeutic Interventions Met (OT Eval)  yes;treatment indicated  -MW        Rehab Potential (OT Eval)  good, to achieve  stated therapy goals  -MW        Therapy Frequency (OT Eval)  5 times/wk  -MW        Predicted Duration of Therapy Intervention (Therapy Eval)  until d/c  -MW        Care Plan Review (OT)  evaluation/treatment results reviewed;care plan/treatment goals reviewed;risks/benefits reviewed;current/potential barriers reviewed;patient/other agree to care plan  -MW        Anticipated Discharge Disposition (OT)  home with 24/7 care;home with home health;transitional care;skilled nursing facility  -MW           Planned OT Interventions    Planned Therapy Interventions (OT Eval)  activity tolerance training;BADL retraining;functional balance retraining;occupation/activity based interventions;patient/caregiver education/training;transfer/mobility retraining;strengthening exercise  -MW           OT Goals    Transfer Goal Selection (OT)  transfer, OT goal 1  -MW        Dressing Goal Selection (OT)  dressing, OT goal 1  -MW        Toileting Goal Selection (OT)  toileting, OT goal 1  -MW           Transfer Goal 1 (OT)    Activity/Assistive Device (Transfer Goal 1, OT)  sit-to-stand/stand-to-sit;bed-to-chair/chair-to-bed;toilet;tub;tub bench  -MW        Davis Level/Cues Needed (Transfer Goal 1, OT)  minimum assist (75% or more patient effort)  -MW        Time Frame (Transfer Goal 1, OT)  long term goal (LTG);by discharge  -MW        Progress/Outcome (Transfer Goal 1, OT)  goal ongoing  -MW           Dressing Goal 1 (OT)    Activity/Assistive Device (Dressing Goal 1, OT)  lower body dressing  -MW        Davis/Cues Needed (Dressing Goal 1, OT)  minimum assist (75% or more patient effort)  -MW        Time Frame (Dressing Goal 1, OT)  long term goal (LTG);by discharge  -MW        Progress/Outcome (Dressing Goal 1, OT)  goal ongoing  -MW           Toileting Goal 1 (OT)    Activity/Device (Toileting Goal 1, OT)  toileting skills, all;commode;commode, 3-in-1  -MW        Davis Level/Cues Needed (Toileting Goal 1, OT)   minimum assist (75% or more patient effort)  -MW        Time Frame (Toileting Goal 1, OT)  long term goal (LTG);by discharge  -MW        Progress/Outcome (Toileting Goal 1, OT)  goal ongoing  -MW           Living Environment    Home Accessibility  tub/shower is not walk in grab bars in shower  -MW          User Key  (r) = Recorded By, (t) = Taken By, (c) = Cosigned By    Initials Name Effective Dates    MW MesaSofy man PAXTON, OTR/L 08/28/18 -                OT Recommendation and Plan  Outcome Summary/Treatment Plan (OT)  Anticipated Discharge Disposition (OT): home with 24/7 care, home with home health, transitional care, skilled nursing facility  Planned Therapy Interventions (OT Eval): activity tolerance training, BADL retraining, functional balance retraining, occupation/activity based interventions, patient/caregiver education/training, transfer/mobility retraining, strengthening exercise  Therapy Frequency (OT Eval): 5 times/wk  Plan of Care Review  Plan of Care Reviewed With: patient, son  Plan of Care Reviewed With: patient, son  Outcome Summary: OT eval completed. Pt significantly limited by pain, rates 9/10 throughout eval. ModAx2 for all bed mobility and to come to stand. Unable to maintain TTWB to take steps at this time. Max-dep LB dressing and hygiene following incontinence. Pt reports she has been unable to control bladder when coughing and has increased tremors in RUE since injury. OT indicated to address ADL, transfer, and functional mobility to increase independence and safety. Pt and family plan for her to return home at d/c, recommend  vs transitional care for continued rehab.     Outcome Measures     Row Name 01/01/20 1400             How much help from another is currently needed...    Putting on and taking off regular lower body clothing?  2  -MW      Bathing (including washing, rinsing, and drying)  2  -MW      Toileting (which includes using toilet bed pan or urinal)  2  -MW      Putting on  and taking off regular upper body clothing  3  -MW      Taking care of personal grooming (such as brushing teeth)  4  -MW      Eating meals  4  -MW      AM-PAC 6 Clicks Score (OT)  17  -MW         Functional Assessment    Outcome Measure Options  AM-PAC 6 Clicks Daily Activity (OT)  -MW        User Key  (r) = Recorded By, (t) = Taken By, (c) = Cosigned By    Initials Name Provider Type    MW Sofy Mesa, OTR/L Occupational Therapist          Time Calculation:   Time Calculation- OT     Row Name 01/01/20 1416             Time Calculation- OT    OT Start Time  1300  -MW      OT Stop Time  1400  -MW      OT Time Calculation (min)  60 min  -MW      OT Received On  01/01/20  -MW      OT Goal Re-Cert Due Date  01/11/20  -MW        User Key  (r) = Recorded By, (t) = Taken By, (c) = Cosigned By    Initials Name Provider Type     Sofy Mesa, OTR/L Occupational Therapist        Therapy Charges for Today     Code Description Service Date Service Provider Modifiers Qty    61346053514 HC OT EVAL MOD COMPLEXITY 4 1/1/2020 Sofy Mesa, OTR/L GO 1               Sofy Mesa OTR/L  1/1/2020    Electronically signed by Sofy Mesa OTR/L at 01/01/20 1417     Sofy Mesa OTR/L at 01/01/20 1412          Problem: Patient Care Overview  Goal: Plan of Care Review  Outcome: Ongoing (interventions implemented as appropriate)  Flowsheets (Taken 1/1/2020 1315)  Plan of Care Reviewed With: patient;son  Outcome Summary: OT eval completed. Pt significantly limited by pain, rates 9/10 throughout eval. ModAx2 for all bed mobility and to come to stand. Unable to maintain TTWB to take steps at this time. Max-dep LB dressing and hygiene following incontinence. Pt reports she has been unable to control bladder when coughing and has increased tremors in RUE since injury. OT indicated to address ADL, transfer, and functional mobility to increase independence and safety. Pt and family plan for her to return home at d/c  which would include 5 hour drive to [place].  Recommend SNF vs transitional care stay for continued rehab prior to return home.        Electronically signed by Sofy Mesa, OTR/L at 01/01/20 3208

## 2020-01-02 NOTE — THERAPY TREATMENT NOTE
Acute Care - Physical Therapy Treatment Note  [facility]     Patient Name: Roberta Persaud  : 1931  MRN: [id number]  Today's Date: 2020  Onset of Illness/Injury or Date of Surgery: 19     Referring Physician: Dr. Alavrado    Admit Date: 2020    Visit Dx:    ICD-10-CM ICD-9-CM   1. Closed displaced fracture of pelvis, unspecified part of pelvis, initial encounter (CMS/HCC) S32.9XXA 808.8   2. KASANDRA (acute kidney injury) (CMS/HCC) N17.9 584.9   3. Leukocytosis, unspecified type D72.829 288.60   4. Impaired mobility Z74.09 799.89   5. Impaired mobility and ADLs Z74.09 799.89     Patient Active Problem List   Diagnosis   • Fever in adult   • Altered mental status   • Closed displaced fracture of pelvis (CMS/[facility])   • GERD (gastroesophageal reflux disease)   • Coronary artery disease   • Acute pulmonary edema (CMS/HCC)   • COPD (chronic obstructive pulmonary disease) (CMS/HCC)   • Acute kidney injury (CMS/[facility])   • Anemia       Therapy Treatment    Rehabilitation Treatment Summary     Row Name 20 1500 20 1316 20 1300       Treatment Time/Intention    Discipline  physical therapy assistant  -  occupational therapy assistant  -TS  physical therapy assistant  -    Document Type  therapy note (daily note)  -  --  therapy note (daily note)  -[facility]    Subjective Information  complains of;pain;weakness  -  --  complains of;pain  -2    Comment  --  working with PT, will check back  -TS  --    Existing Precautions/Restrictions  fall;oxygen therapy device and L/min RLE TTWB  -  --  fall;oxygen therapy device and L/min RLE TTWB  -2    Treatment Considerations/Comments  --  --  RLE TTWB  -2    Recorded by [AH] Denise Jerome, PTA 20 1523 [TS] Georgina Orozco COTA/L 20 1316 [AH] Denise Jerome, PTA 20 1305  [2] Denise Jerome, PTA 20 1407    Row Name 20 1014             Treatment Time/Intention    Discipline  physical therapy assistant  -       Comment  son requested to check back after lunch when pt has had pain med  -AH2      Reason Treatment Not Performed  patient/family declined treatment  -AH2      Recorded by [] Denise Jerome, PTA 01/02/20 1015  [2] Denise Jerome, PTA 01/02/20 1023      Row Name 01/02/20 1500 01/02/20 1300          Mobility Assessment/Intervention    Extremity Weight-bearing Status  right lower extremity  -  right lower extremity  -AH     Right Lower Extremity (Weight-bearing Status)  toe touch weight-bearing (TTWB)  -AH  toe touch weight-bearing (TTWB)  -AH     Recorded by [] Denise Jerome, PTA 01/02/20 1523 [] Denise Jerome, PTA 01/02/20 1407     Row Name 01/02/20 1500 01/02/20 1300          Bed Mobility Assessment/Treatment    Supine-Sit [place] (Bed Mobility)  -- chair  -AH  moderate assist (50% patient effort);verbal cues  -     Sit-Supine [place] (Bed Mobility)  minimum assist (75% patient effort);moderate assist (50% patient effort);2 person assist;verbal cues  -  -- CHAIR  -     Comment (Bed Mobility)  --  extended time for bed mobility, due to pain  -     Recorded by [] Denise Jerome, PTA 01/02/20 1523 [] Denise Jerome, PTA 01/02/20 1407     Row Name 01/02/20 1500 01/02/20 1300          Transfer Assessment/Treatment    Transfer Assessment/Treatment  sit-stand transfer;stand-sit transfer;stand pivot/stand step transfer  -  sit-stand transfer;stand-sit transfer;stand pivot/stand step transfer  -     Comment (Transfers)  stood from chair,stand pivot chair-BSC-bed  -AH  stood x 2, stand pivot bed-chair toward left  -     Recorded by [] Denise Jerome, PTA 01/02/20 1523 [] Denise Jerome, PTA 01/02/20 1407     Row Name 01/02/20 1500 01/02/20 1300          Sit-Stand Transfer    Sit-Stand [place] (Transfers)  minimum assist (75% patient effort);2 person assist;verbal cues  -  minimum assist (75% patient effort);2 person assist;verbal cues  -     Recorded by []  Denise Jerome, [facility] 01/02/20 1523 [] Denise Jerome, PTA 01/02/20 1407     Row Name 01/02/20 1500 01/02/20 1300          Stand-Sit Transfer    Stand-Sit [place] (Transfers)  minimum assist (75% patient effort);2 person assist;verbal cues  -  minimum assist (75% patient effort);2 person assist;verbal cues  -     Recorded by [] Denise Jerome, [facility] 01/02/20 1523 [] Denise Jerome, [facility] 01/02/20 1407     Row Name 01/02/20 1500 01/02/20 1300          Stand Pivot/Stand Step Transfer    Stand Pivot/Stand Step [place]  minimum assist (75% patient effort);2 person assist;verbal cues  -  minimum assist (75% patient effort);2 person assist;verbal cues  -     Assistive Device (Stand Pivot Stand Step Transfer)  walker, front-wheeled  -  walker, front-wheeled  -     Recorded by [] Denise Jerome, [facility] 01/02/20 1523 [] Denise Jerome, [facility] 01/02/20 1407     Row Name 01/02/20 1300             Balance    Balance  static sitting balance  -      Recorded by [] Denise Jerome, [facility] 01/02/20 1407      Row Name 01/02/20 1300             Static Sitting Balance    Level of [place] (Unsupported Sitting, Static Balance)  supervision  -      Sitting Position (Unsupported Sitting, Static Balance)  sitting on edge of bed  -      Time Able to Maintain Position (Unsupported Sitting, Static Balance)  -- 15 minutes  -      Recorded by [] Denise Jerome, [facility] 01/02/20 1407      Row Name 01/02/20 1300             Static Standing Balance    Level of [place] (Supported Standing, Static Balance)  minimal assist, 75% patient effort;contact guard assist  -      Assistive Device Utilized (Supported Standing, Static Balance)  walker, rolling  -      Recorded by [] Denise Jerome, PTA 01/02/20 1407      Row Name 01/02/20 1500 01/02/20 1300          Positioning and Restraints    Pre-Treatment Position  sitting in chair/recliner  -  in bed  -     Post Treatment Position  bed  -  chair  -AH      In Bed  fowlers;call light within reach;encouraged to call for assist;exit alarm on;with family/caregiver;notified nsg  -  --     In Chair  --  sitting;call light within reach;encouraged to call for assist;with family/caregiver;notified nsg  -AH     Recorded by [] Denise Jerome, \[facility]\"" 01/02/20 1523 [] Denise Jerome, \[facility]\"" 01/02/20 1407     Row Name 01/02/20 1500 01/02/20 1300          Pain Scale: Numbers Pre/Post-Treatment    Pain Scale: Numbers, Pretreatment  8/10  -AH  8/10  -AH     Pain Scale: Numbers, Post-Treatment  8/10  -AH  8/10  -AH     Pain Location - Side  Right  -AH  Right  -AH     Pain Location  hip  -AH  hip  -AH     Pain Intervention(s)  Medication (See MAR);Repositioned  -AH  Medication (See MAR);Repositioned  -AH     Recorded by [] Denise Jerome, \[facility]\"" 01/02/20 1523 [] Denise Jerome, \[facility]\"" 01/02/20 1407       User Key  (r) = Recorded By, (t) = Taken By, (c) = Cosigned By    Initials Name Effective Dates Discipline     Denise Jerome, PTA 08/02/16 -  PT    TS Georgina Orozco COTA/L 08/02/16 -  OT                       PT Recommendation and Plan     Plan of Care Reviewed With: patient, grandchild(corina), son  Progress: improving  Outcome Summary: pt trans to EOB mod assist, extended time for supine-sit due to pain, pt sat EOB SBA 15 minutes, sit-stand min of 2, stand pivot bed-chair minof 2 with rwx, pt would benefit from SNF  Outcome Measures     Row Name 01/01/20 1400             How much help from another is currently needed...    Putting on and taking off regular lower body clothing?  2  -MW      Bathing (including washing, rinsing, and drying)  2  -MW      Toileting (which includes using toilet bed pan or urinal)  2  -MW      Putting on and taking off regular upper body clothing  3  -MW      Taking care of personal grooming (such as brushing teeth)  4  -MW      Eating meals  4  -MW      AM-PAC 6 Clicks Score (OT)  17  -MW         Functional Assessment    Outcome Measure  Options  AM-PAC 6 Clicks Daily Activity (OT)  -        User Key  (r) = Recorded By, (t) = Taken By, (c) = Cosigned By    Initials Name Provider Type     Sofy Mesa, OTR/L Occupational Therapist         Time Calculation:   PT Charges     Row Name 01/02/20 1523 01/02/20 1409          Time Calculation    Start Time  1500  -AH  1300  -AH     Stop Time  1523  -AH  1400  -AH     Time Calculation (min)  23 min  -AH  60 min  -AH     PT Received On  --  01/02/20  -        Time Calculation- PT    Total Timed Code Minutes- PT  23 minute(s)  -AH  60 minute(s)  -AH        Timed Charges    98217 - PT Therapeutic Activity Minutes  23  -AH  60  -AH       User Key  (r) = Recorded By, (t) = Taken By, (c) = Cosigned By    Initials Name Provider Type     Denise Jerome PTA Physical Therapy Assistant        Therapy Charges for Today     Code Description Service Date Service Provider Modifiers Qty    49586521677 HC PT THERAPEUTIC ACT EA 15 MIN 1/2/2020 Denise Jerome PTA GP 4    36136846401 HC PT THERAPEUTIC ACT EA 15 MIN 1/2/2020 Denise Jerome PTA GP 2          PT G-Codes  Outcome Measure Options: AM-PAC 6 Clicks Daily Activity (OT)  AM-PAC 6 Clicks Score (PT): 10  AM-PAC 6 Clicks Score (OT): 17    Denise Jerome PTA  1/2/2020

## 2020-01-02 NOTE — PLAN OF CARE
Problem: Patient Care Overview  Goal: Plan of Care Review  Outcome: Ongoing (interventions implemented as appropriate)  Flowsheets (Taken 1/2/2020 1407)  Progress: improving  Plan of Care Reviewed With: patient; grandchild(corina); son  Outcome Summary: pt trans to EOB mod assist, extended time for supine-sit due to pain, pt sat EOB SBA 15 minutes, sit-stand min of 2, stand pivot bed-chair minof 2 with rwx, pt would benefit from SNF

## 2020-01-02 NOTE — PLAN OF CARE
Problem: Patient Care Overview  Goal: Plan of Care Review  Outcome: Ongoing (interventions implemented as appropriate)  Flowsheets (Taken 1/2/2020 0312)  Progress: no change  Plan of Care Reviewed With: patient  Outcome Summary: Patient c/o pain, prn pain meds given as ordered. VSS, Safety maintained.

## 2020-01-02 NOTE — PROGRESS NOTES
Continued Stay Note  [facility]     Patient Name: Roberta Persaud  MRN: [id number]  Today's Date: 1/2/2020    Admit Date: 1/1/2020    Discharge Plan     Row Name 01/02/20 1637       Plan    Plan Comments  SPOKE TO PT SON ABOUT DC PLANNING AND HE IS REQUESTING DC ON MONDAY AS HE WILL BE TRANSPORTING PT TO [place].     Row Name 01/02/20 8049       Plan    Plan Comments   ORDERED TB SKIN TEST AND SPOKE TO LAB. LAB WILL BE DRAWN IN AM AND SENT TO [place]. POSSIBLE RESULTS BACK TOMORROW AFTERNOON. TB SKIN TEST WILL NEED TO BE FAXED TO FACILITY [phone].     Row Name 01/02/20 2488       Plan    Plan Comments  SPOKE TO [facility] IN [place] ([phone]) AND THEY CAN TAKE ON SATURDAY PENDING CHEST XRAY AND TB SKIN TEST. SENT MESSAGE TO PHYSICIAN TO INFORM OF NEEDED TB SKIN TEST.         Discharge Codes    No documentation.             DAVID Ge

## 2020-01-02 NOTE — PROGRESS NOTES
Continued Stay Note  [facility]     Patient Name: Roberta Persaud  MRN: [id number]  Today's Date: 1/2/2020    Admit Date: 1/1/2020    Discharge Plan     Row Name 01/02/20 6607       Plan    Plan Comments   ORDERED TB SKIN TEST AND SPOKE TO LAB. LAB WILL BE DRAWN IN AM AND SENT TO [place]. POSSIBLE RESULTS BACK TOMORROW AFTERNOON. TB SKIN TEST WILL NEED TO BE FAXED TO FACILITY [phone].     Row Name 01/02/20 2333       Plan    Plan Comments  SPOKE TO [facility] IN [place] ([phone]) AND THEY CAN TAKE ON SATURDAY PENDING CHEST XRAY AND TB SKIN TEST. SENT MESSAGE TO PHYSICIAN TO INFORM OF NEEDED TB SKIN TEST.         Discharge Codes    No documentation.             DAVID Ge

## 2020-01-02 NOTE — PROGRESS NOTES
Continued Stay Note   [facility]     Patient Name: Roberta Persaud  MRN: [id number]  Today's Date: 1/2/2020    Admit Date: 1/1/2020    Discharge Plan     Row Name 01/02/20 1514       Plan    Plan Comments  SPOKE TO CINDY WITH [facility] IN [place] ([phone]) AND THEY CAN TAKE ON SATURDAY PENDING CHEST XRAY AND TB SKIN TEST. SENT MESSAGE TO PHYSICIAN TO INFORM OF NEEDED TB SKIN TEST.         Discharge Codes    No documentation.             DAVID Ge

## 2020-01-02 NOTE — THERAPY TREATMENT NOTE
Acute Care - Physical Therapy Treatment Note  [facility]     Patient Name: Roberta Persaud  : 1931  MRN: [id number]  Today's Date: 2020  Onset of Illness/Injury or Date of Surgery: 19     Referring Physician: Dr. Alvarado    Admit Date: 2020    Visit Dx:    ICD-10-CM ICD-9-CM   1. Closed displaced fracture of pelvis, unspecified part of pelvis, initial encounter (CMS/HCC) S32.9XXA 808.8   2. KASANDRA (acute kidney injury) (CMS/HCC) N17.9 584.9   3. Leukocytosis, unspecified type D72.829 288.60   4. Impaired mobility Z74.09 799.89   5. Impaired mobility and ADLs Z74.09 799.89     Patient Active Problem List   Diagnosis   • Fever in adult   • Altered mental status   • Closed displaced fracture of pelvis (CMS/[facility])   • GERD (gastroesophageal reflux disease)   • Coronary artery disease   • Acute pulmonary edema (CMS/HCC)   • COPD (chronic obstructive pulmonary disease) (CMS/HCC)   • Acute kidney injury (CMS/HCC)   • Anemia       Therapy Treatment    Rehabilitation Treatment Summary     Row Name 20 1316 20 1300 20 1014       Treatment Time/Intention    Discipline  occupational therapy assistant  -TS  physical therapy assistant  -  physical therapy assistant  -    Document Type  --  therapy note (daily note)  -[facility]  --    Subjective Information  --  complains of;pain  -[facility]  --    Comment  working with PT, will check back  -TS  --  son requested to check back after lunch when pt has had pain med  -[facility]    Reason Treatment Not Performed  --  --  patient/family declined treatment  -[facility]    Existing Precautions/Restrictions  --  fall;oxygen therapy device and L/min RLE TTWB  -[facility]  --    Treatment Considerations/Comments  --  RLE TTWB  -[facility]  --    Recorded by [TS] Georgina Orozco COTA/L 20 1316 [] Denise Jerome, PTA 20 1305  [2] Denise Jerome, PTA 20 1407 [] Denise Jerome, PTA 20 1015  [2] Denise Jerome, PTA 20 1023    Row Name 20  1300             Mobility Assessment/Intervention    Extremity Weight-bearing Status  right lower extremity  -      Right Lower Extremity (Weight-bearing Status)  toe touch weight-bearing (TTWB)  -AH      Recorded by [] Denise Jerome, PTA 01/02/20 1407      Row Name 01/02/20 1300             Bed Mobility Assessment/Treatment    Supine-Sit [place] (Bed Mobility)  moderate assist (50% patient effort);verbal cues  -      Sit-Supine [place] (Bed Mobility)  -- CHAIR  -      Comment (Bed Mobility)  extended time for bed mobility, due to pain  -AH      Recorded by [] Denise Jerome, PTA 01/02/20 1407      Row Name 01/02/20 1300             Transfer Assessment/Treatment    Transfer Assessment/Treatment  sit-stand transfer;stand-sit transfer;stand pivot/stand step transfer  -      Comment (Transfers)  stood x 2, stand pivot bed-chair toward left  -AH      Recorded by [] Denise Jerome, PTA 01/02/20 1407      Row Name 01/02/20 1300             Sit-Stand Transfer    Sit-Stand [place] (Transfers)  minimum assist (75% patient effort);2 person assist;verbal cues  -      Recorded by [] Denise Jerome, PTA 01/02/20 1407      Row Name 01/02/20 1300             Stand-Sit Transfer    Stand-Sit [place] (Transfers)  minimum assist (75% patient effort);2 person assist;verbal cues  -      Recorded by [] Denise Jerome, PTA 01/02/20 1407      Row Name 01/02/20 1300             Stand Pivot/Stand Step Transfer    Stand Pivot/Stand Step [place]  minimum assist (75% patient effort);2 person assist;verbal cues  -      Assistive Device (Stand Pivot Stand Step Transfer)  walker, front-wheeled  -AH      Recorded by [] Denise Jreome, PTA 01/02/20 1407      Row Name 01/02/20 1300             Balance    Balance  static sitting balance  -      Recorded by [] Denise Jerome, PTA 01/02/20 1407      Row Name 01/02/20 1300             Static Sitting Balance    Level of [place]  (Unsupported Sitting, Static Balance)  supervision  -      Sitting Position (Unsupported Sitting, Static Balance)  sitting on edge of bed  -      Time Able to Maintain Position (Unsupported Sitting, Static Balance)  -- 15 minutes  -      Recorded by [] Denise Jerome, [facility] 01/02/20 1407      Row Name 01/02/20 1300             Static Standing Balance    Level of [place] (Supported Standing, Static Balance)  minimal assist, 75% patient effort;contact guard assist  -      Assistive Device Utilized (Supported Standing, Static Balance)  walker, rolling  -      Recorded by [] Denise Jerome, PTA 01/02/20 1407      Row Name 01/02/20 1300             Positioning and Restraints    Pre-Treatment Position  in bed  -      Post Treatment Position  chair  -      In Chair  sitting;call light within reach;encouraged to call for assist;with family/caregiver;notified ns  -      Recorded by [] Denise Jerome, [facility] 01/02/20 1407      Row Name 01/02/20 1300             Pain Scale: Numbers Pre/Post-Treatment    Pain Scale: Numbers, Pretreatment  8/10  -      Pain Scale: Numbers, Post-Treatment  8/10  -      Pain Location - Side  Right  -AH      Pain Location  hip  -AH      Pain Intervention(s)  Medication (See MAR);Repositioned  -      Recorded by [] Denise Jerome, [facility] 01/02/20 1407        User Key  (r) = Recorded By, (t) = Taken By, (c) = Cosigned By    Initials Name Effective Dates Discipline     Denise Jerome, [facility] 08/02/16 -  PT    Georgina Cross COTA/L 08/02/16 -  OT                       PT Recommendation and Plan     Plan of Care Reviewed With: patient, grandchild(corina), son  Progress: improving  Outcome Summary: pt trans to EOB mod assist, extended time for supine-sit due to pain, pt sat EOB SBA 15 minutes, sit-stand min of 2, stand pivot bed-chair minof 2 with rwx, pt would benefit from SNF  Outcome Measures     Row Name 01/01/20 1400             How much help from another is  currently needed...    Putting on and taking off regular lower body clothing?  2  -MW      Bathing (including washing, rinsing, and drying)  2  -MW      Toileting (which includes using toilet bed pan or urinal)  2  -MW      Putting on and taking off regular upper body clothing  3  -MW      Taking care of personal grooming (such as brushing teeth)  4  -MW      Eating meals  4  -MW      AM-PAC 6 Clicks Score (OT)  17  -MW         Functional Assessment    Outcome Measure Options  AM-PAC 6 Clicks Daily Activity (OT)  -        User Key  (r) = Recorded By, (t) = Taken By, (c) = Cosigned By    Initials Name Provider Type     Sofy Mesa, OTR/L Occupational Therapist         Time Calculation:   PT Charges     Row Name 01/02/20 1409             Time Calculation    Start Time  1300  -      Stop Time  1400  -AH      Time Calculation (min)  60 min  -AH      PT Received On  01/02/20  -         Time Calculation- PT    Total Timed Code Minutes- PT  60 minute(s)  -AH         Timed Charges    11509 - PT Therapeutic Activity Minutes  60  -AH        User Key  (r) = Recorded By, (t) = Taken By, (c) = Cosigned By    Initials Name Provider Type     Denise Jerome PTA Physical Therapy Assistant        Therapy Charges for Today     Code Description Service Date Service Provider Modifiers Qty    32905023246  PT THERAPEUTIC ACT EA 15 MIN 1/2/2020 Denise Jerome PTA GP 4          PT G-Codes  Outcome Measure Options: AM-PAC 6 Clicks Daily Activity (OT)  AM-PAC 6 Clicks Score (PT): 10  AM-PAC 6 Clicks Score (OT): 17    Denise Jerome PTA  1/2/2020

## 2020-01-02 NOTE — PROGRESS NOTES
Discharge Planning Assessment  [facility]     Patient Name: Roberta Persaud  MRN: [id number]  Today's Date: 1/2/2020    Admit Date: 1/1/2020    Discharge Needs Assessment     Row Name 01/02/20 1154       Living Environment    Lives With  alone    Current Living Arrangements  home/apartment/condo    Primary Care Provided by  self    Provides Primary Care For  no one    Family Caregiver if Needed  child(corina), adult    Quality of Family Relationships  helpful;involved    Able to Return to Prior Arrangements  yes       Resource/Environmental Concerns    Resource/Environmental Concerns  none    Transportation Concerns  car, none       Transition Planning    Patient/Family Anticipates Transition to  inpatient rehabilitation facility;long term care facility    Patient/Family Anticipated Services at Transition  skilled nursing    Transportation Anticipated  family or friend will provide       Discharge Needs Assessment    Readmission Within the Last 30 Days  no previous admission in last 30 days    Concerns to be Addressed  denies needs/concerns at this time    Equipment Currently Used at Home  walker, rolling    Anticipated Changes Related to Illness  inability to care for self    Equipment Needed After Discharge  other (see comments) snf will provide     Outpatient/Agency/Support Group Needs  skilled nursing facility    Discharge Facility/Level of Care Needs  nursing facility, skilled    Discharge Coordination/Progress  SPOKE TO PT SON REGARDING DC PLANS. PT/SON LIVES IN [facility] AND [place]. DUE TO FX PELVIS PT WILL NEED SNF FOR REHAB. PT SON HAS REQUESTED [facility] NURSING AND [facility]. CALLED AND SPOKE TO CINDY IN ADMISSIONS AT [facility] ([phone]) AND HAVE FAXED REFERRAL [phone]. PT WILL HAVE HER 3 NIGHT INPT STAY ON 1-4-20. CALLED [facility] AND SPOKE TO MELANIE ([phone]) TO FIND OUT IF EMS WOULD BE COVERED FOR TRANSPORT TO [place] BUT [facility] WILL ONLY COVER CLOSEST  FACILITY.         Discharge Plan    No documentation.       Destination      Coordination has not been started for this encounter.      Durable Medical Equipment      Coordination has not been started for this encounter.      Dialysis/Infusion      Coordination has not been started for this encounter.      Home Medical Care      Coordination has not been started for this encounter.      Therapy      Coordination has not been started for this encounter.      Community Resources      Coordination has not been started for this encounter.          Demographic Summary    No documentation.       Functional Status    No documentation.       Psychosocial    No documentation.       Abuse/Neglect    No documentation.       Legal    No documentation.       Substance Abuse    No documentation.       Patient Forms    No documentation.           DAVID Ge

## 2020-01-03 LAB
ANION GAP SERPL CALCULATED.3IONS-SCNC: 10 MMOL/L (ref 5–15)
BUN BLD-MCNC: 42 MG/DL (ref 8–23)
BUN/CREAT SERPL: 19.6 (ref 7–25)
CALCIUM SPEC-SCNC: 8.4 MG/DL (ref 8.6–10.5)
CHLORIDE SERPL-SCNC: 95 MMOL/L (ref 98–107)
CO2 SERPL-SCNC: 34 MMOL/L (ref 22–29)
CREAT BLD-MCNC: 2.14 MG/DL (ref 0.57–1)
DEPRECATED RDW RBC AUTO: 54.7 FL (ref 37–54)
ERYTHROCYTE [DISTWIDTH] IN BLOOD BY AUTOMATED COUNT: 15.9 % (ref 12.3–15.4)
GFR SERPL CREATININE-BSD FRML MDRD: 22 ML/MIN/1.73
GLUCOSE BLD-MCNC: 111 MG/DL (ref 65–99)
HCT VFR BLD AUTO: 27.9 % (ref 34–46.6)
HGB BLD-MCNC: 8.4 G/DL (ref 12–15.9)
MCH RBC QN AUTO: 28.3 PG (ref 26.6–33)
MCHC RBC AUTO-ENTMCNC: 30.1 G/DL (ref 31.5–35.7)
MCV RBC AUTO: 93.9 FL (ref 79–97)
PLATELET # BLD AUTO: 260 10*3/MM3 (ref 140–450)
PMV BLD AUTO: 11.2 FL (ref 6–12)
POTASSIUM BLD-SCNC: 4.1 MMOL/L (ref 3.5–5.2)
RBC # BLD AUTO: 2.97 10*6/MM3 (ref 3.77–5.28)
SODIUM BLD-SCNC: 139 MMOL/L (ref 136–145)
WBC NRBC COR # BLD: 7.96 10*3/MM3 (ref 3.4–10.8)

## 2020-01-03 PROCEDURE — 97535 SELF CARE MNGMENT TRAINING: CPT

## 2020-01-03 PROCEDURE — 80048 BASIC METABOLIC PNL TOTAL CA: CPT | Performed by: INTERNAL MEDICINE

## 2020-01-03 PROCEDURE — 97530 THERAPEUTIC ACTIVITIES: CPT

## 2020-01-03 PROCEDURE — 86481 TB AG RESPONSE T-CELL SUSP: CPT | Performed by: INTERNAL MEDICINE

## 2020-01-03 PROCEDURE — 94799 UNLISTED PULMONARY SVC/PX: CPT

## 2020-01-03 PROCEDURE — 85027 COMPLETE CBC AUTOMATED: CPT | Performed by: INTERNAL MEDICINE

## 2020-01-03 PROCEDURE — 25010000002 ENOXAPARIN PER 10 MG: Performed by: INTERNAL MEDICINE

## 2020-01-03 RX ORDER — SODIUM CHLORIDE, SODIUM LACTATE, POTASSIUM CHLORIDE, CALCIUM CHLORIDE 600; 310; 30; 20 MG/100ML; MG/100ML; MG/100ML; MG/100ML
75 INJECTION, SOLUTION INTRAVENOUS CONTINUOUS
Status: DISPENSED | OUTPATIENT
Start: 2020-01-03 | End: 2020-01-04

## 2020-01-03 RX ORDER — BISACODYL 10 MG
10 SUPPOSITORY, RECTAL RECTAL ONCE
Status: COMPLETED | OUTPATIENT
Start: 2020-01-03 | End: 2020-01-03

## 2020-01-03 RX ORDER — TIZANIDINE 2 MG/1
2 TABLET ORAL
COMMUNITY

## 2020-01-03 RX ORDER — AMLODIPINE BESYLATE 5 MG/1
5 TABLET ORAL 2 TIMES DAILY
COMMUNITY
End: 2020-01-06 | Stop reason: HOSPADM

## 2020-01-03 RX ORDER — HYDROXYZINE HYDROCHLORIDE 10 MG/1
10 TABLET, FILM COATED ORAL 3 TIMES DAILY PRN
COMMUNITY

## 2020-01-03 RX ORDER — EZETIMIBE 10 MG/1
10 TABLET ORAL
COMMUNITY
End: 2020-01-06 | Stop reason: HOSPADM

## 2020-01-03 RX ORDER — AMOXICILLIN 250 MG
2 CAPSULE ORAL 2 TIMES DAILY
Status: DISCONTINUED | OUTPATIENT
Start: 2020-01-03 | End: 2020-01-05

## 2020-01-03 RX ORDER — NITROGLYCERIN 400 UG/1
1 SPRAY ORAL
COMMUNITY

## 2020-01-03 RX ADMIN — BUDESONIDE AND FORMOTEROL FUMARATE DIHYDRATE 2 PUFF: 160; 4.5 AEROSOL RESPIRATORY (INHALATION) at 07:46

## 2020-01-03 RX ADMIN — OXYCODONE HYDROCHLORIDE AND ACETAMINOPHEN 1 TABLET: 10; 325 TABLET ORAL at 12:24

## 2020-01-03 RX ADMIN — BUDESONIDE AND FORMOTEROL FUMARATE DIHYDRATE 2 PUFF: 160; 4.5 AEROSOL RESPIRATORY (INHALATION) at 19:36

## 2020-01-03 RX ADMIN — GABAPENTIN 600 MG: 300 CAPSULE ORAL at 20:04

## 2020-01-03 RX ADMIN — SODIUM CHLORIDE, POTASSIUM CHLORIDE, SODIUM LACTATE AND CALCIUM CHLORIDE 75 ML/HR: 600; 310; 30; 20 INJECTION, SOLUTION INTRAVENOUS at 14:09

## 2020-01-03 RX ADMIN — OXYCODONE HYDROCHLORIDE AND ACETAMINOPHEN 1 TABLET: 10; 325 TABLET ORAL at 22:07

## 2020-01-03 RX ADMIN — PANTOPRAZOLE SODIUM 40 MG: 40 TABLET, DELAYED RELEASE ORAL at 20:04

## 2020-01-03 RX ADMIN — OXYCODONE HYDROCHLORIDE AND ACETAMINOPHEN 1 TABLET: 10; 325 TABLET ORAL at 08:29

## 2020-01-03 RX ADMIN — LIDOCAINE 1 PATCH: 50 PATCH CUTANEOUS at 16:10

## 2020-01-03 RX ADMIN — ATENOLOL 25 MG: 25 TABLET ORAL at 08:29

## 2020-01-03 RX ADMIN — ACETAMINOPHEN 500 MG: 500 TABLET, FILM COATED ORAL at 18:28

## 2020-01-03 RX ADMIN — PANTOPRAZOLE SODIUM 40 MG: 40 TABLET, DELAYED RELEASE ORAL at 08:29

## 2020-01-03 RX ADMIN — SENNOSIDES AND DOCUSATE SODIUM 1 TABLET: 8.6; 5 TABLET ORAL at 11:17

## 2020-01-03 RX ADMIN — ACETAMINOPHEN 500 MG: 500 TABLET, FILM COATED ORAL at 06:01

## 2020-01-03 RX ADMIN — SERTRALINE 150 MG: 100 TABLET, FILM COATED ORAL at 08:29

## 2020-01-03 RX ADMIN — OXYCODONE HYDROCHLORIDE AND ACETAMINOPHEN 1 TABLET: 10; 325 TABLET ORAL at 16:49

## 2020-01-03 RX ADMIN — ISOSORBIDE MONONITRATE 90 MG: 30 TABLET, EXTENDED RELEASE ORAL at 08:29

## 2020-01-03 RX ADMIN — POLYETHYLENE GLYCOL (3350) 17 G: 17 POWDER, FOR SOLUTION ORAL at 08:29

## 2020-01-03 RX ADMIN — ACETAMINOPHEN 500 MG: 500 TABLET, FILM COATED ORAL at 00:06

## 2020-01-03 RX ADMIN — ACETAMINOPHEN 500 MG: 500 TABLET, FILM COATED ORAL at 12:24

## 2020-01-03 RX ADMIN — BISACODYL 10 MG: 10 SUPPOSITORY RECTAL at 20:04

## 2020-01-03 RX ADMIN — SODIUM CHLORIDE, PRESERVATIVE FREE 10 ML: 5 INJECTION INTRAVENOUS at 20:05

## 2020-01-03 RX ADMIN — ENOXAPARIN SODIUM 30 MG: 30 INJECTION SUBCUTANEOUS at 18:28

## 2020-01-03 RX ADMIN — ATORVASTATIN CALCIUM 80 MG: 40 TABLET, FILM COATED ORAL at 20:04

## 2020-01-03 RX ADMIN — SENNOSIDES AND DOCUSATE SODIUM 2 TABLET: 8.6; 5 TABLET ORAL at 20:04

## 2020-01-03 NOTE — PLAN OF CARE
Problem: Patient Care Overview  Goal: Plan of Care Review  Flowsheets (Taken 1/3/2020 0312)  Plan of Care Reviewed With: patient  Outcome Summary: Alert and orientedx4. PRN pain meds given with good relief noted. Pt able to sleep most of the night. VSS. Safety maintained

## 2020-01-03 NOTE — PROGRESS NOTES
[facility] Medicine Services  INPATIENT PROGRESS NOTE    Patient Name: Roberta Persaud  Date of Admission: 1/1/2020  Today's Date: 01/03/20  Length of Stay: 2  Primary Care Physician: Provider, No Known    Subjective   Chief Complaint: pelvic pain      HPI:      Patient was seen and examined at bedside.  Patient indicates that her pain is much better.  Patient indicates that sitting up in the chair has helped her some.  The patient denies any nausea or vomiting.  Patient has not had a bowel movement since she has arrived.  She requested to help her with a bowel movement.        Review of Systems   Constitutional: Positive for fatigue. Negative for activity change, appetite change and chills.   Respiratory: Negative for cough and shortness of breath.    Cardiovascular: Negative for chest pain and palpitations.   Gastrointestinal: Negative for abdominal distention and diarrhea.   Musculoskeletal: Positive for arthralgias and back pain.        Pelvic pain   Neurological: Positive for weakness.        All pertinent negatives and positives are as above. All other systems have been reviewed and are negative unless otherwise stated.     Objective    Temp:  [97.6 °F (36.4 °C)-98.3 °F (36.8 °C)] 98 °F (36.7 °C)  Heart Rate:  [55-62] 59  Resp:  [14-18] 16  BP: ()/(37-71) 94/50  Physical Exam   Constitutional: She is oriented to person, place, and time. No distress.   Son at bedside   HENT:   Head: Normocephalic and atraumatic.   Eyes: Conjunctivae are normal. No scleral icterus.   Neck: Neck supple. No JVD present.   Cardiovascular: Normal rate and regular rhythm.   Murmur heard.  Pulmonary/Chest: Effort normal. She has no wheezes. She has rales.   Abdominal: Soft. Bowel sounds are normal. She exhibits no distension and no mass. There is no tenderness. There is no guarding.   Musculoskeletal: She exhibits no edema.   Neurological: She is alert and oriented to person, place, and  time.   Skin: Skin is warm and dry. She is not diaphoretic. No erythema.   Psychiatric: She has a normal mood and affect. Her behavior is normal.   Nursing note and vitals reviewed.          Results Review:  I have reviewed the labs, radiology results, and diagnostic studies.    Laboratory Data:   Results from last 7 days   Lab Units 01/03/20  0602 01/02/20  0523 01/01/20  0151   WBC 10*3/mm3 7.96 8.75 17.27*   HEMOGLOBIN g/dL 8.4* 9.3* 10.6*   HEMATOCRIT % 27.9* 30.9* 35.3   PLATELETS 10*3/mm3 260 326 417        Results from last 7 days   Lab Units 01/03/20  0602 01/02/20  0523 01/01/20  0151   SODIUM mmol/L 139 145 144   POTASSIUM mmol/L 4.1 4.3 4.0   CHLORIDE mmol/L 95* 99 97*   CO2 mmol/L 34.0* 36.0* 33.0*   BUN mg/dL 42* 32* 35*   CREATININE mg/dL 2.14* 1.57* 2.13*   CALCIUM mg/dL 8.4* 8.5* 9.3   BILIRUBIN mg/dL  --   --  0.2   ALK PHOS U/L  --   --  40   ALT (SGPT) U/L  --   --  17   AST (SGOT) U/L  --   --  29   GLUCOSE mg/dL 111* 102* 101*       Culture Data:   No results found for: BLOODCX, URINECX, WOUNDCX, MRSACX, RESPCX, STOOLCX    Radiology Data:   Imaging Results (Last 24 Hours)     ** No results found for the last 24 hours. **          I have reviewed the patient's current medications.     Assessment/Plan     Active Hospital Problems    Diagnosis   • Closed displaced fracture of pelvis (CMS/HCC)   • GERD (gastroesophageal reflux disease)   • Coronary artery disease   • Acute pulmonary edema (CMS/HCC)   • COPD (chronic obstructive pulmonary disease) (CMS/HCC)   • Acute kidney injury (CMS/HCC)   • Anemia       Plan:  1.  Enoxaparin 30 mg daily for DVT PPx - Would like to switch to xarelto 10 if creatinine continues to improve   2.  Schedule tylenol 500 mg q6h  3.  One dose of ibuprofen 600 mg   4.  Increased morphine to 4 mg IV PRN   5.  Increase frequency of PO pain medication, percocet 10 mg q4h PRN   6.  Lidoderm patch  7.  Bowel regimen   8.  PT/OT  9.  SW consult for placement   10.  Increased  docusate-senna to 2 tablets BID   11.  Bisacodyl suppository x 1        Case discussed with bedside RN, son, and .      Weight-bearing status TTWB               Discharge Planning: I expect the patient to be discharged to SNF in ? days.    Sami Griffin MD   01/03/20   3:14 PM

## 2020-01-03 NOTE — THERAPY TREATMENT NOTE
Acute Care - Occupational Therapy Treatment Note  [facility]     Patient Name: Roberta Persaud  : 1931  MRN: [id number]  Today's Date: 1/3/2020  Onset of Illness/Injury or Date of Surgery: 19  Date of Referral to OT: 20  Referring Physician: Dr. Alvarado    Admit Date: 2020       ICD-10-CM ICD-9-CM   1. Closed displaced fracture of pelvis, unspecified part of pelvis, initial encounter (CMS/[facility]) S32.9XXA 808.8   2. KASANDRA (acute kidney injury) (CMS/[facility]) N17.9 584.9   3. Leukocytosis, unspecified type D72.829 288.60   4. Impaired mobility Z74.09 799.89   5. Impaired mobility and ADLs Z74.09 799.89     Patient Active Problem List   Diagnosis   • Fever in adult   • Altered mental status   • Closed displaced fracture of pelvis (CMS/[facility])   • GERD (gastroesophageal reflux disease)   • Coronary artery disease   • Acute pulmonary edema (CMS/[facility])   • COPD (chronic obstructive pulmonary disease) (CMS/[facility])   • Acute kidney injury (CMS/[facility])   • Anemia     Past Medical History:   Diagnosis Date   • Arthritis    • COPD (chronic obstructive pulmonary disease) (CMS/[facility])    • Coronary artery disease    • GERD (gastroesophageal reflux disease)      Past Surgical History:   Procedure Laterality Date   • CORONARY ARTERY BYPASS GRAFT  2001       Therapy Treatment    Rehabilitation Treatment Summary     Row Name 20 1329 20 1131 20 0920       Treatment Time/Intention    Discipline  occupational therapy assistant  -TS  --  -  physical therapy assistant  -    Document Type  therapy note (daily note)  -TS  --  therapy note (daily note)  -[facility]    Subjective Information  complains of;pain  -TS2  --  complains of;pain;weakness  -[facility]    Patient Effort  adequate  -TS2  --  --    Existing Precautions/Restrictions  fall;oxygen therapy device and L/min  -TS2  --  fall;oxygen therapy device and L/min RLE TTWB  -[facility]    Treatment Considerations/Comments  RLE TTWB  -TS2  --  --    Recorded by [TS] Pam  Georgina VILLA, RICKS/L 01/03/20 1329  [TS2] Georgina Orozco, RICKS/L 01/03/20 1419 [] Denise Jerome, PTA 01/03/20 1134 [] Denise Jerome, PTA 01/03/20 0924  [AH2] Denise Jerome, PTA 01/03/20 1013    Row Name 01/03/20 1329             Vital Signs    Post SpO2 (%)  95  -TS      O2 Delivery Post Treatment  supplemental O2  -TS      Post Patient Position  Sitting  -TS      Recorded by [TS] Georgina Orozco RICKS/L 01/03/20 1419      Row Name 01/03/20 1329 01/03/20 1131          Cognitive Assessment/Intervention- PT/OT    Personal Safety Interventions  fall prevention program maintained;gait belt;nonskid shoes/slippers when out of bed  -TS  fall prevention program maintained;gait belt;nonskid shoes/slippers when out of bed  -TS     Recorded by [TS] Georgina Orozco RICKS/L 01/03/20 1427 [TS] Georgina Orozco RICKS/L 01/03/20 1419     Row Name 01/03/20 0920             Mobility Assessment/Intervention    Extremity Weight-bearing Status  right lower extremity  -AH      Right Lower Extremity (Weight-bearing Status)  toe touch weight-bearing (TTWB)  -      Recorded by [] Denise Jerome, PTA 01/03/20 1013      Row Name 01/03/20 1329 01/03/20 0920          Bed Mobility Assessment/Treatment    Bed Mobility Assessment/Treatment  supine-sit  -TS  --     Supine-Sit [place] (Bed Mobility)  minimum assist (75% patient effort);verbal cues  -TS  moderate assist (50% patient effort);verbal cues  -     Sit-Supine [place] (Bed Mobility)  --  -- CHAIR  -     Assistive Device (Bed Mobility)  draw sheet;head of bed elevated;bed rails  -TS  --     Comment (Bed Mobility)  extra time for bed mobility due to pain   -TS  extended time for mobility  -     Recorded by [TS] Georgina Orozco RICKS/L 01/03/20 1427 [] Denise Jerome, PTA 01/03/20 1013     Row Name 01/03/20 1329 01/03/20 0920          Transfer Assessment/Treatment    Transfer Assessment/Treatment  sit-stand transfer;stand-sit  transfer;bed-chair transfer  -TS  sit-stand transfer;stand-sit transfer;stand pivot/stand step transfer;toilet transfer  -     Comment (Transfers)  elevated EOB  -TS  pivot bed-BSC-chair  -AH     Recorded by [TS] Georgina Orozco COTA/L 01/03/20 1427 [] Denise Jerome, PTA 01/03/20 1013     Row Name 01/03/20 1329             Bed-Chair Transfer    Bed-Chair [place] (Transfers)  minimum assist (75% patient effort)  -TS      Assistive Device (Bed-Chair Transfers)  walker, front-wheeled  -TS      Recorded by [TS] Georgina Orozco RICKS/L 01/03/20 1427      Row Name 01/03/20 1329 01/03/20 0920          Sit-Stand Transfer    Sit-Stand [place] (Transfers)  contact guard;minimum assist (75% patient effort)  -TS  minimum assist (75% patient effort);2 person assist;verbal cues  -     Assistive Device (Sit-Stand Transfers)  walker, front-wheeled  -TS  --     Recorded by [TS] Georgina Orozco RICKS/L 01/03/20 1427 [] Denise Jerome, PTA 01/03/20 1013     Row Name 01/03/20 1329 01/03/20 0920          Stand-Sit Transfer    Stand-Sit [place] (Transfers)  contact guard;2 person assist  -TS  minimum assist (75% patient effort);2 person assist;verbal cues  -     Assistive Device (Stand-Sit Transfers)  walker, front-wheeled  -TS  --     Recorded by [TS] Georgina Orozco COTA/L 01/03/20 1427 [] Denise Jerome, PTA 01/03/20 1013     Row Name 01/03/20 0920             Stand Pivot/Stand Step Transfer    Stand Pivot/Stand Step [place]  minimum assist (75% patient effort);2 person assist;verbal cues  -      Assistive Device (Stand Pivot Stand Step Transfer)  walker, front-wheeled  -AH      Recorded by [] Denise Jerome, PTA 01/03/20 1013      Row Name 01/03/20 0920             Toilet Transfer    Type (Toilet Transfer)  stand pivot/stand step  -AH      [place] Level (Toilet Transfer)  minimum assist (75% patient effort);moderate assist (50% patient effort);verbal cues  -AH       Recorded by [] Denise Jerome, PTA 01/03/20 1013      Row Name 01/03/20 1329 01/03/20 0920          Positioning and Restraints    Pre-Treatment Position  in bed  -TS  in bed  -AH     Post Treatment Position  chair  -TS  chair  -AH     In Chair  sitting;call light within reach;encouraged to call for assist;with family/caregiver;waffle cushion  -TS  reclined;call light within reach;encouraged to call for assist;with family/caregiver;notified nsg  -AH     Recorded by [] Georgina Orozco RICKS/L 01/03/20 1427 [] Denise Jerome, PTA 01/03/20 1013     Row Name 01/03/20 1329 01/03/20 0920          Pain Scale: Numbers Pre/Post-Treatment    Pain Scale: Numbers, Pretreatment  5/10  -TS  9/10  -AH     Pain Scale: Numbers, Post-Treatment  7/10  -TS  9/10  -AH     Pain Location - Side  Right  -TS  Right  -AH     Pain Location  hip  -TS  hip  -AH     Pain Intervention(s)  Repositioned  -TS  Medication (See MAR);Repositioned  -AH     Recorded by [] Georgina Orozco RICKS/L 01/03/20 1427 [] Denise Jerome, PTA 01/03/20 1013     Row Name 01/03/20 1329             Outcome Summary/Treatment Plan (OT)    Daily Summary of Progress (OT)  progress towards functional goals is fair  -TS      Recorded by [] Georgina Orozco COTA/L 01/03/20 1427        User Key  (r) = Recorded By, (t) = Taken By, (c) = Cosigned By    Initials Name Effective Dates Discipline     Denise Jerome, PTA 08/02/16 -  PT     Georgina Orozco RICKS/L 08/02/16 -  OT                 OT Recommendation and Plan  Outcome Summary/Treatment Plan (OT)  Daily Summary of Progress (OT): progress towards functional goals is fair  Daily Summary of Progress (OT): progress towards functional goals is fair  Outcome Measures     Row Name 01/03/20 1400 01/01/20 1400          How much help from another is currently needed...    Putting on and taking off regular lower body clothing?  2  -TS  2  -MW     Bathing (including washing, rinsing, and drying)   2  -TS  2  -MW     Toileting (which includes using toilet bed pan or urinal)  2  -TS  2  -MW     Putting on and taking off regular upper body clothing  3  -TS  3  -MW     Taking care of personal grooming (such as brushing teeth)  4  -TS  4  -MW     Eating meals  4  -TS  4  -MW     AM-PAC 6 Clicks Score (OT)  17  -TS  17  -MW        Functional Assessment    Outcome Measure Options  AM-PAC 6 Clicks Daily Activity (OT)  -TS  AM-PAC 6 Clicks Daily Activity (OT)  -MW       User Key  (r) = Recorded By, (t) = Taken By, (c) = Cosigned By    Initials Name Provider Type    TS Georgina Orozco RICKS/L Occupational Therapy Assistant    Sofy Guillen, OTR/L Occupational Therapist           Time Calculation:   Time Calculation- OT     Row Name 01/03/20 1427             Time Calculation- OT    OT Start Time  1329  -TS      OT Stop Time  1415  -TS      OT Time Calculation (min)  46 min  -TS      Total Timed Code Minutes- OT  46 minute(s)  -TS      OT Received On  01/03/20  -TS         Timed Charges    29148 - OT Self Care/Mgmt Minutes  46  -TS        User Key  (r) = Recorded By, (t) = Taken By, (c) = Cosigned By    Initials Name Provider Type    TS Georgina Orozco COTA/L Occupational Therapy Assistant        Therapy Charges for Today     Code Description Service Date Service Provider Modifiers Qty    [id number] HC OT SELF CARE/MGMT/TRAIN EA 15 MIN 1/3/2020 Georgina Orozco COTA/L GO 3               Georgina VILLA. MILLICENT Orozco/GIANLUCA  1/3/2020

## 2020-01-03 NOTE — PLAN OF CARE
Problem: Patient Care Overview  Goal: Plan of Care Review  Outcome: Ongoing (interventions implemented as appropriate)  Flowsheets (Taken 1/3/2020 1013)  Progress: improving  Plan of Care Reviewed With: patient; son  Outcome Summary: pt trans to left side of bed min-mod assist, extended time to mobility, sit-stand min assist, pivot to BSC with rwx min assist, then pivot to chair. pt would benefit from rehab

## 2020-01-03 NOTE — PLAN OF CARE
Problem: Patient Care Overview  Goal: Plan of Care Review  Outcome: Ongoing (interventions implemented as appropriate)  Flowsheets  Taken 1/3/2020 1442 by [place], RN  Progress: improving  Outcome Summary: A&OX4, c/o pain in R hip. PRN pain medication given. BP low, asymptomatic. LR infusing. Tolerating diet. Up x1 w RW. Denies any n/t. Continue to monitor. TSPOT test will be resulted late Sunday night or early Monday morning according to lab. MD notified. Safety maintained.  Taken 1/3/2020 1013 by Denise Jerome PTA  Plan of Care Reviewed With: patient;son     Problem: Patient Care Overview  Goal: Individualization and Mutuality  Outcome: Ongoing (interventions implemented as appropriate)  Flowsheets (Taken 1/2/2020 1834)  Patient Specific Goals (Include Timeframe): Increase activity before d/c

## 2020-01-03 NOTE — PLAN OF CARE
Problem: Patient Care Overview  Goal: Plan of Care Review  Outcome: Ongoing (interventions implemented as appropriate)  Flowsheets  Taken 1/2/2020 1407 by Denise Jerome PTA  Progress: improving  Plan of Care Reviewed With: patient;grandchild(corina);son  Taken 1/2/2020 1834 by [place] RN  Outcome Summary: A&OX4. C/o pain in R hip and abdomen. PRN pain medication give. Scheduled medication seems to be giving pt relief as well. Pt does not like to move to get up to BSC or use bedpan and gets frustrated when encouraging activity. Lovenox for VTE prevention. Continue to monitor. Safety maintained.     Problem: Fall Risk (Adult)  Goal: Absence of Fall  Description  Patient will demonstrate the desired outcomes by discharge/transition of care.  Outcome: Ongoing (interventions implemented as appropriate)     Problem: Patient Care Overview  Goal: Individualization and Mutuality  Outcome: Ongoing (interventions implemented as appropriate)  Flowsheets (Taken 1/2/2020 1834)  Patient Specific Goals (Include Timeframe): Increase activity before d/c

## 2020-01-03 NOTE — PROGRESS NOTES
Continued Stay Note   [place]     Patient Name: Roberta Persaud  MRN: [id number]  Today's Date: 1/3/2020    Admit Date: 1/1/2020    Discharge Plan     Row Name 01/03/20 1658       Plan    Plan Comments  PAL spoke with pt's son who states that plan is for discharge on Monday to [facility] and pt's son will transport. PAL is awaiting for tb skin test to be completed to fax info to nursing facility. PAL will follow. Phone: [phone] Fax: [phone]        Discharge Codes    No documentation.             Merlyn Roach

## 2020-01-04 LAB
ALBUMIN SERPL-MCNC: 3.6 G/DL (ref 3.5–5.2)
ANION GAP SERPL CALCULATED.3IONS-SCNC: 10 MMOL/L (ref 5–15)
BUN BLD-MCNC: 43 MG/DL (ref 8–23)
BUN/CREAT SERPL: 24.3 (ref 7–25)
CALCIUM SPEC-SCNC: 8.4 MG/DL (ref 8.6–10.5)
CHLORIDE SERPL-SCNC: 94 MMOL/L (ref 98–107)
CO2 SERPL-SCNC: 31 MMOL/L (ref 22–29)
CREAT BLD-MCNC: 1.77 MG/DL (ref 0.57–1)
GFR SERPL CREATININE-BSD FRML MDRD: 27 ML/MIN/1.73
GLUCOSE BLD-MCNC: 143 MG/DL (ref 65–99)
PHOSPHATE SERPL-MCNC: 4.2 MG/DL (ref 2.5–4.5)
POTASSIUM BLD-SCNC: 4.6 MMOL/L (ref 3.5–5.2)
SODIUM BLD-SCNC: 135 MMOL/L (ref 136–145)

## 2020-01-04 PROCEDURE — 97110 THERAPEUTIC EXERCISES: CPT

## 2020-01-04 PROCEDURE — 25010000002 ENOXAPARIN PER 10 MG: Performed by: INTERNAL MEDICINE

## 2020-01-04 PROCEDURE — 97530 THERAPEUTIC ACTIVITIES: CPT

## 2020-01-04 PROCEDURE — 80069 RENAL FUNCTION PANEL: CPT | Performed by: INTERNAL MEDICINE

## 2020-01-04 PROCEDURE — 94799 UNLISTED PULMONARY SVC/PX: CPT

## 2020-01-04 PROCEDURE — 94760 N-INVAS EAR/PLS OXIMETRY 1: CPT

## 2020-01-04 RX ORDER — SODIUM CHLORIDE, SODIUM LACTATE, POTASSIUM CHLORIDE, CALCIUM CHLORIDE 600; 310; 30; 20 MG/100ML; MG/100ML; MG/100ML; MG/100ML
75 INJECTION, SOLUTION INTRAVENOUS CONTINUOUS
Status: DISPENSED | OUTPATIENT
Start: 2020-01-04 | End: 2020-01-05

## 2020-01-04 RX ADMIN — POLYETHYLENE GLYCOL (3350) 17 G: 17 POWDER, FOR SOLUTION ORAL at 09:35

## 2020-01-04 RX ADMIN — OXYCODONE HYDROCHLORIDE AND ACETAMINOPHEN 1 TABLET: 10; 325 TABLET ORAL at 14:00

## 2020-01-04 RX ADMIN — ENOXAPARIN SODIUM 30 MG: 30 INJECTION SUBCUTANEOUS at 17:36

## 2020-01-04 RX ADMIN — SENNOSIDES AND DOCUSATE SODIUM 2 TABLET: 8.6; 5 TABLET ORAL at 20:55

## 2020-01-04 RX ADMIN — ACETAMINOPHEN 500 MG: 500 TABLET, FILM COATED ORAL at 01:12

## 2020-01-04 RX ADMIN — GABAPENTIN 600 MG: 300 CAPSULE ORAL at 20:55

## 2020-01-04 RX ADMIN — SODIUM CHLORIDE, PRESERVATIVE FREE 10 ML: 5 INJECTION INTRAVENOUS at 09:35

## 2020-01-04 RX ADMIN — OXYCODONE HYDROCHLORIDE AND ACETAMINOPHEN 1 TABLET: 10; 325 TABLET ORAL at 09:35

## 2020-01-04 RX ADMIN — SERTRALINE 150 MG: 100 TABLET, FILM COATED ORAL at 09:36

## 2020-01-04 RX ADMIN — LIDOCAINE 1 PATCH: 50 PATCH CUTANEOUS at 17:35

## 2020-01-04 RX ADMIN — BUDESONIDE AND FORMOTEROL FUMARATE DIHYDRATE 2 PUFF: 160; 4.5 AEROSOL RESPIRATORY (INHALATION) at 18:28

## 2020-01-04 RX ADMIN — SENNOSIDES AND DOCUSATE SODIUM 2 TABLET: 8.6; 5 TABLET ORAL at 09:35

## 2020-01-04 RX ADMIN — PANTOPRAZOLE SODIUM 40 MG: 40 TABLET, DELAYED RELEASE ORAL at 20:55

## 2020-01-04 RX ADMIN — PANTOPRAZOLE SODIUM 40 MG: 40 TABLET, DELAYED RELEASE ORAL at 09:35

## 2020-01-04 RX ADMIN — ATENOLOL 25 MG: 25 TABLET ORAL at 09:35

## 2020-01-04 RX ADMIN — ACETAMINOPHEN 500 MG: 500 TABLET, FILM COATED ORAL at 06:12

## 2020-01-04 RX ADMIN — SODIUM CHLORIDE, POTASSIUM CHLORIDE, SODIUM LACTATE AND CALCIUM CHLORIDE 75 ML/HR: 600; 310; 30; 20 INJECTION, SOLUTION INTRAVENOUS at 18:20

## 2020-01-04 RX ADMIN — ATORVASTATIN CALCIUM 80 MG: 40 TABLET, FILM COATED ORAL at 20:55

## 2020-01-04 RX ADMIN — BUDESONIDE AND FORMOTEROL FUMARATE DIHYDRATE 2 PUFF: 160; 4.5 AEROSOL RESPIRATORY (INHALATION) at 08:17

## 2020-01-04 RX ADMIN — OXYCODONE HYDROCHLORIDE AND ACETAMINOPHEN 1 TABLET: 10; 325 TABLET ORAL at 22:22

## 2020-01-04 RX ADMIN — ISOSORBIDE MONONITRATE 90 MG: 30 TABLET, EXTENDED RELEASE ORAL at 09:36

## 2020-01-04 NOTE — THERAPY TREATMENT NOTE
Acute Care - Occupational Therapy Treatment Note  [facility]     Patient Name: Roberta Persaud  : 1931  MRN: [id number]  Today's Date: 2020  Onset of Illness/Injury or Date of Surgery: 19  Date of Referral to OT: 20  Referring Physician: Dr. Alvarado    Admit Date: 2020       ICD-10-CM ICD-9-CM   1. Closed displaced fracture of pelvis, unspecified part of pelvis, initial encounter (CMS/HCC) S32.9XXA 808.8   2. KASANDRA (acute kidney injury) (CMS/[facility]) N17.9 584.9   3. Leukocytosis, unspecified type D72.829 288.60   4. Impaired mobility Z74.09 799.89   5. Impaired mobility and ADLs Z74.09 799.89     Patient Active Problem List   Diagnosis   • Fever in adult   • Altered mental status   • Closed displaced fracture of pelvis (CMS/[facility])   • GERD (gastroesophageal reflux disease)   • Coronary artery disease   • Acute pulmonary edema (CMS/[facility])   • COPD (chronic obstructive pulmonary disease) (CMS/[facility])   • Acute kidney injury (CMS/[facility])   • Anemia     Past Medical History:   Diagnosis Date   • Arthritis    • COPD (chronic obstructive pulmonary disease) (CMS/[facility])    • Coronary artery disease    • GERD (gastroesophageal reflux disease)      Past Surgical History:   Procedure Laterality Date   • CORONARY ARTERY BYPASS GRAFT  2001       Therapy Treatment    Rehabilitation Treatment Summary     Row Name 20 1051 20 0959          Treatment Time/Intention    Discipline  occupational therapy assistant  -AO  physical therapy assistant  -NW     Document Type  therapy note (daily note)  -AO  therapy note (daily note)  -NW     Subjective Information  complains of;weakness;fatigue;pain  -AO  complains of;pain  -NW2     Mode of Treatment  occupational therapy  -AO  --     Patient/Family Observations  no family present  -AO  son present  -NW3     Patient Effort  fair  -AO  good  -NW3     Existing Precautions/Restrictions  --  fall;oxygen therapy device and L/min  -NW3     Treatment Considerations/Comments   --  RLE TTWB  -NW3     Recorded by [AO] Amy Dhillon COTA/L 01/04/20 1110 [NW] Kimberly Garcia, PTA 01/04/20 0959  [NW2] Kimberly Garcia, PTA 01/04/20 1032  [NW3] Kimberly Garcia, PTA 01/04/20 1044     Row Name 01/04/20 1051             Cognitive Assessment/Intervention- PT/OT    Affect/Mental Status (Cognitive)  WFL  -AO      Orientation Status (Cognition)  oriented x 4  -AO      Follows Commands (Cognition)  increased processing time needed;WFL;repetition of directions required;verbal cues/prompting required  -AO      Cognitive Function (Cognitive)  WFL;attention deficit  -AO      Attention Deficit (Cognitive)  mild deficit  -AO      Personal Safety Interventions  fall prevention program maintained;muscle strengthening facilitated;gait belt;nonskid shoes/slippers when out of bed  -AO      Recorded by [AO] Amy Dhillon COTA/L 01/04/20 1132      Row Name 01/04/20 1051 01/04/20 0959          Safety Issues, Functional Mobility    Safety Issues Affecting Function (Mobility)  awareness of need for assistance;friction/shear risk;judgment  -AO  friction/shear risk;safety precaution awareness  -NW     Impairments Affecting Function (Mobility)  balance;pain;strength;endurance/activity tolerance  -AO  pain;strength  -NW     Recorded by [AO] Amy Dhillon COTA/L 01/04/20 1132 [NW] Kimberly Garcia, PTA 01/04/20 1044     Row Name 01/04/20 1051 01/04/20 0959          Mobility Assessment/Intervention    Extremity Weight-bearing Status  right lower extremity  -AO  right lower extremity  -NW     Right Lower Extremity (Weight-bearing Status)  touch down weight-bearing (TDWB)  -AO  toe touch weight-bearing (TTWB)  -NW     Recorded by [AO] Amy Dhillon COTA/L 01/04/20 1132 [NW] Kimberly Garcia, PTA 01/04/20 1044     Row Name 01/04/20 0959             Bed Mobility Assessment/Treatment    Supine-Sit [place] (Bed Mobility)  verbal cues;moderate assist (50% patient effort) w. sons help  -NW      Sit-Supine  [place] (Bed Mobility)  -- chair  -NW      Assistive Device (Bed Mobility)  draw sheet;bed rails  -NW      Comment (Bed Mobility)  extra time for OOB due to pain  -NW      Recorded by [NW] Kimberly Garcia, PTA 01/04/20 1044      Row Name 01/04/20 0959             Sit-Stand Transfer    Sit-Stand [place] (Transfers)  verbal cues;contact guard;minimum assist (75% patient effort)  -NW      Assistive Device (Sit-Stand Transfers)  walker, front-wheeled  -NW      Recorded by [NW] Kimberly Garcia, PTA 01/04/20 1044      Row Name 01/04/20 0959             Stand-Sit Transfer    Stand-Sit [place] (Transfers)  verbal cues;contact guard  -NW      Assistive Device (Stand-Sit Transfers)  walker, front-wheeled  -NW      Recorded by [NW] Kimberly Garcia, PTA 01/04/20 1044      Row Name 01/04/20 0959             Gait/Stairs Assessment/Training    [place] Level (Gait)  verbal cues;contact guard;minimum assist (75% patient effort)  -NW      Assistive Device (Gait)  walker, front-wheeled  -NW      Distance in Feet (Gait)  3'  -NW      Pattern (Gait)  step-to  -NW      Deviations/Abnormal Patterns (Gait)  base of support, narrow;antalgic;stride length decreased;gait speed decreased  -NW      Bilateral Gait Deviations  heel strike decreased  -NW      Comment (Gait/Stairs)  help w/ AD cues for steps and maintaining WB status  -NW      Recorded by [NW] Kimberly Garcia, PTA 01/04/20 1044      Row Name 01/04/20 1051             ADL Assessment/Intervention    BADL Assessment/Intervention  grooming  -AO      Recorded by [AO] Amy Dhillon COTA/L 01/04/20 1132      Row Name 01/04/20 1051             Grooming Assessment/Training    [place] Level (Grooming)  wash face, hands  -AO      Grooming Position  supported sitting  -AO      Recorded by [AO] Amy Dhillon RICKS/L 01/04/20 1132      Row Name 01/04/20 1051             BADL Safety/Performance    Impairments, BADL Safety/Performance  balance;endurance/activity  tolerance;pain;strength  -AO      Skilled BADL Treatment/Intervention  BADL process/adaptation training  -AO      Progress in BADL Status  cueing required  -AO      Recorded by [AO] Amy Dhillon COTA/L 01/04/20 1132      Row Name 01/04/20 0959             General ROM    GENERAL ROM COMMENTS  AROM LLE x10 sitting AROM RLE gentle LAQs and APs  -NW      Recorded by [NW] Kimberly Garcia, PTA 01/04/20 1044      Row Name 01/04/20 1051             Therapeutic Exercise    Upper Extremity Range of Motion (Therapeutic Exercise)  shoulder flexion/extension, bilateral;shoulder abduction/adduction, bilateral;shoulder horizontal abduction/adduction, bilateral;elbow flexion/extension, bilateral;forearm supination/pronation, bilateral;wrist flexion/extension, bilateral  -AO      Exercise Type (Therapeutic Exercise)  AROM (active range of motion)  -AO      Recorded by [AO] Amy Dhillon COTA/L 01/04/20 1132      Row Name 01/04/20 1051 01/04/20 0959          Positioning and Restraints    Pre-Treatment Position  sitting in chair/recliner  -AO  in bed  -NW     Post Treatment Position  chair  -AO  chair  -NW     In Bed  --  sitting;call light within reach;encouraged to call for assist;with family/caregiver  -NW     In Chair  sitting;call light within reach;encouraged to call for assist  -AO  --     Recorded by [AO] Amy Dhillon COTA/L 01/04/20 1132 [NW] Kimberly Garcia, [facility] 01/04/20 1044     Row Name 01/04/20 1051             Pain Assessment    Additional Documentation  Pain Scale: Numbers Pre/Post-Treatment (Group)  -AO      Recorded by [AO] Amy Dhillon COTA/L 01/04/20 1132      Row Name 01/04/20 1051 01/04/20 0959          Pain Scale: Numbers Pre/Post-Treatment    Pain Scale: Numbers, Pretreatment  8/10  -AO  --     Pain Scale: Numbers, Post-Treatment  8/10  -AO  --     Pain Location - Side  Right  -AO  Right  -NW     Pain Location  hip  -AO  hip  -NW     Pain Intervention(s)  Repositioned  -AO  Repositioned unable to  give rating  -NW     Recorded by [AO] Amy Dhillon COTA/L 01/04/20 1132 [NW] Jose Kimberly FERNANDES, PTA 01/04/20 1044     Row Name 01/04/20 1051             Plan of Care Review    Plan of Care Reviewed With  patient  -AO      Recorded by [AO] Amy Dhillon COTA/L 01/04/20 1132      Row Name 01/04/20 1051             Outcome Summary/Treatment Plan (OT)    Daily Summary of Progress (OT)  progress towards functional goals is fair  -AO      Anticipated Discharge Disposition (OT)  skilled nursing facility  -AO      Recorded by [AO] Amy Dhillon RICKS/L 01/04/20 1132        User Key  (r) = Recorded By, (t) = Taken By, (c) = Cosigned By    Initials Name Effective Dates Discipline    NW Kimberly Garcia, PTA 08/02/16 -  PT    AO MARIKA'Amy Carmona RICKS/L 10/01/19 -  OT                 OT Recommendation and Plan  Outcome Summary/Treatment Plan (OT)  Daily Summary of Progress (OT): progress towards functional goals is fair  Anticipated Discharge Disposition (OT): skilled nursing facility  Daily Summary of Progress (OT): progress towards functional goals is fair  Plan of Care Review  Plan of Care Reviewed With: patient  Plan of Care Reviewed With: patient  Outcome Summary: Patient declines ADL routine, but is agreeable to exercise with encouragement. Attempted UE ther ex with resistance, however patient was unable to tolerate at this time. Patient completed BUE AROM x 10 reps in all planes to increase ue strength, rom, and act braulio for increased I with adl tasks. Patient requires frequent rb, and max cues to attend to task. Patient is very distracted, and fatigues quickly. Patient is tearful, and states that he is upset about the recent death of her grandson, as well as her current situation. Patient would benefit from continued therapy at snf.  Outcome Measures     Row Name 01/04/20 1051 01/04/20 1000 01/03/20 1400       How much help from another person do you currently need...    Turning from your back to your side while in  flat bed without using bedrails?  --  2  -NW  --    Moving from lying on back to sitting on the side of a flat bed without bedrails?  --  2  -NW  --    Moving to and from a bed to a chair (including a wheelchair)?  --  3  -NW  --    Standing up from a chair using your arms (e.g., wheelchair, bedside chair)?  --  3  -NW  --    Climbing 3-5 steps with a railing?  --  1  -NW  --    To walk in hospital room?  --  2  -NW  --    AM-PAC 6 Clicks Score (PT)  --  13  -NW  --       How much help from another is currently needed...    Putting on and taking off regular lower body clothing?  2  -AO  --  2  -TS    Bathing (including washing, rinsing, and drying)  2  -AO  --  2  -TS    Toileting (which includes using toilet bed pan or urinal)  2  -AO  --  2  -TS    Putting on and taking off regular upper body clothing  3  -AO  --  3  -TS    Taking care of personal grooming (such as brushing teeth)  4  -AO  --  4  -TS    Eating meals  4  -AO  --  4  -TS    AM-PAC 6 Clicks Score (OT)  17  -AO  --  17  -TS       Functional Assessment    Outcome Measure Options  --  AM-PAC 6 Clicks Basic Mobility (PT)  -NW  AM-PAC 6 Clicks Daily Activity (OT)  -TS    Row Name 01/01/20 1400             How much help from another is currently needed...    Putting on and taking off regular lower body clothing?  2  -MW      Bathing (including washing, rinsing, and drying)  2  -MW      Toileting (which includes using toilet bed pan or urinal)  2  -MW      Putting on and taking off regular upper body clothing  3  -MW      Taking care of personal grooming (such as brushing teeth)  4  -MW      Eating meals  4  -MW      AM-PAC 6 Clicks Score (OT)  17  -MW         Functional Assessment    Outcome Measure Options  AM-PAC 6 Clicks Daily Activity (OT)  -MW        User Key  (r) = Recorded By, (t) = Taken By, (c) = Cosigned By    Initials Name Provider Type    TS Georgina Orozco, RICKS/L Occupational Therapy Assistant    NW Kimberly Garcia, PTA Physical Therapy  Assistant    Sofy Guillen, OTR/L Occupational Therapist    BARON O'Amy Carmona RICKS/L Occupational Therapy Assistant           Time Calculation:   Time Calculation- OT     Row Name 01/04/20 1133             Time Calculation- OT    OT Start Time  1051  -AO      OT Stop Time  1122  -AO      OT Time Calculation (min)  31 min  -AO      Total Timed Code Minutes- OT  31 minute(s)  -AO      OT Received On  01/04/20  -AO        User Key  (r) = Recorded By, (t) = Taken By, (c) = Cosigned By    Initials Name Provider Type    BARON O'Amy Carmona, RICKS/L Occupational Therapy Assistant        Therapy Charges for Today     Code Description Service Date Service Provider Modifiers Qty    01917886306 HC OT THER PROC EA 15 MIN 1/4/2020 MARIKA'Amy Carmona RICKS/L GO 2               Amyjacquie Dhillon RICKS/L  1/4/2020

## 2020-01-04 NOTE — PLAN OF CARE
Problem: Patient Care Overview  Goal: Plan of Care Review  Outcome: Ongoing (interventions implemented as appropriate)  Flowsheets (Taken 1/4/2020 1051)  Outcome Summary: Patient declines ADL routine, but is agreeable to exercise with encouragement. Attempted UE ther ex with resistance, however patient was unable to tolerate at this time. Patient completed BUE AROM x 10 reps in all planes to increase ue strength, rom, and act braulio for increased I with adl tasks. Patient requires frequent rb, and max cues to attend to task. Patient is very distracted, and fatigues quickly. Patient is tearful, and states that she is upset about the recent death of her grandson, as well as her current situation. Patient would benefit from continued therapy at snf.

## 2020-01-04 NOTE — PLAN OF CARE
Problem: Patient Care Overview  Goal: Plan of Care Review  Outcome: Ongoing (interventions implemented as appropriate)  Flowsheets (Taken 1/4/2020 1516)  Progress: no change  Plan of Care Reviewed With: patient; son  Outcome Summary: Patient medicated prn for pain, refusing scheduled extra strength tylenol.  Up X 2 to BSC, requires frequent encouragement to ambulate, prefers when son assists her in transferring.  No falls this shift, safety maintained.   Absence of Fall: achieves outcome

## 2020-01-04 NOTE — PLAN OF CARE
Problem: Patient Care Overview  Goal: Plan of Care Review  Flowsheets  Taken 1/3/2020 2000  Plan of Care Reviewed With: patient;son  Taken 1/4/2020 1417  Outcome Summary: Pt is alert and oriented. PRN pain meds given with good relief. Pt can get agitated when trying to reposition in the bed when helped by staff. IV fluids infusing. Safety maintained

## 2020-01-04 NOTE — PLAN OF CARE
Problem: Patient Care Overview  Goal: Plan of Care Review  Flowsheets (Taken 1/4/2020 1044)  Progress: improving  Plan of Care Reviewed With: patient  Outcome Summary: Bed mobiity mod 1-2 son helped assist to EOB. Pt required several minutes once EOB due to increased pain before able to attempt to stand. sit-stand min/cga x1. Pt was able to take few steps to chair w/ cues to maintain WB status and help w/ AD. Pt/son plan to d/c Monday back to [place]

## 2020-01-04 NOTE — PROGRESS NOTES
[facility] Medicine Services  INPATIENT PROGRESS NOTE    Patient Name: Roberta Persaud  Date of Admission: 1/1/2020  Today's Date: 01/04/20  Length of Stay: 3  Primary Care Physician: Provider, No Known    Subjective   Chief Complaint: pelvic pain      HPI:      Patient was seen and examined at bedside.  Patient indicates that her pelvic pain is much better.  Patient is noted to have an improvement in her creatinine.  Patient has known chronic kidney disease.  Patient is setting up.  Patient is looking forward to getting back to [place].  We are awaiting her TB skin test.        Review of Systems   Constitutional: Positive for fatigue. Negative for activity change, appetite change and chills.   Respiratory: Negative for cough and shortness of breath.    Cardiovascular: Negative for chest pain and palpitations.   Gastrointestinal: Negative for abdominal distention and diarrhea.   Musculoskeletal: Positive for arthralgias and back pain.        Pelvic pain   Neurological: Positive for weakness.        All pertinent negatives and positives are as above. All other systems have been reviewed and are negative unless otherwise stated.     Objective    Temp:  [97.6 °F (36.4 °C)-98.4 °F (36.9 °C)] 97.6 °F (36.4 °C)  Heart Rate:  [56-60] 57  Resp:  [14-16] 16  BP: ()/(37-50) 111/41  Physical Exam   Constitutional: She is oriented to person, place, and time. No distress.   Son at bedside; sitting up in chair    HENT:   Head: Normocephalic and atraumatic.   Eyes: Conjunctivae are normal. No scleral icterus.   Neck: Neck supple. No JVD present.   Cardiovascular: Normal rate and regular rhythm.   Murmur heard.  Pulmonary/Chest: Effort normal. She has no wheezes. She has no rales.   Abdominal: Soft. Bowel sounds are normal. She exhibits no distension and no mass. There is no tenderness. There is no guarding.   Musculoskeletal: She exhibits no edema.   Neurological: She is alert and  oriented to person, place, and time.   Skin: Skin is warm and dry. She is not diaphoretic. No erythema.   Psychiatric: She has a normal mood and affect. Her behavior is normal.   Nursing note and vitals reviewed.          Results Review:  I have reviewed the labs, radiology results, and diagnostic studies.    Laboratory Data:   Results from last 7 days   Lab Units 01/03/20  0602 01/02/20  0523 01/01/20  0151   WBC 10*3/mm3 7.96 8.75 17.27*   HEMOGLOBIN g/dL 8.4* 9.3* 10.6*   HEMATOCRIT % 27.9* 30.9* 35.3   PLATELETS 10*3/mm3 260 326 417        Results from last 7 days   Lab Units 01/03/20  0602 01/02/20  0523 01/01/20  0151   SODIUM mmol/L 139 145 144   POTASSIUM mmol/L 4.1 4.3 4.0   CHLORIDE mmol/L 95* 99 97*   CO2 mmol/L 34.0* 36.0* 33.0*   BUN mg/dL 42* 32* 35*   CREATININE mg/dL 2.14* 1.57* 2.13*   CALCIUM mg/dL 8.4* 8.5* 9.3   BILIRUBIN mg/dL  --   --  0.2   ALK PHOS U/L  --   --  40   ALT (SGPT) U/L  --   --  17   AST (SGOT) U/L  --   --  29   GLUCOSE mg/dL 111* 102* 101*       Culture Data:   No results found for: BLOODCX, URINECX, WOUNDCX, MRSACX, RESPCX, STOOLCX    Radiology Data:   Imaging Results (Last 24 Hours)     ** No results found for the last 24 hours. **          I have reviewed the patient's current medications.     Assessment/Plan     Active Hospital Problems    Diagnosis   • Closed displaced fracture of pelvis (CMS/[facility])   • GERD (gastroesophageal reflux disease)   • Coronary artery disease   • Acute pulmonary edema (CMS/HCC)   • COPD (chronic obstructive pulmonary disease) (CMS/[facility])   • Acute kidney injury (CMS/[facility])   • Anemia       Plan:  1.  Enoxaparin 30 mg daily for DVT PPx - Would like to switch to xarelto 10 if creatinine continues to improve   2.  Schedule tylenol 500 mg q6h  3.  One dose of ibuprofen 600 mg   4.  Increased morphine to 4 mg IV PRN   5.  Increase frequency of PO pain medication, percocet 10 mg q4h PRN   6.  Lidoderm patch  7.  Bowel regimen   8.  PT/OT  9.  SW consult for  placement   10.  Continue docusate-senna to 2 tablets BID   11.  Patient had a BM with suppository   12.  AM CBC and BMP       Case discussed with bedside RN, son, and .      Weight-bearing status TTWB               Discharge Planning: I expect the patient to be discharged to SNF in ? days.    Sami Griffin MD   01/04/20   11:45 AM

## 2020-01-05 LAB
ABO GROUP BLD: NORMAL
ANION GAP SERPL CALCULATED.3IONS-SCNC: 10 MMOL/L (ref 5–15)
BLD GP AB SCN SERPL QL: NEGATIVE
BUN BLD-MCNC: 48 MG/DL (ref 8–23)
BUN/CREAT SERPL: 25.1 (ref 7–25)
CALCIUM SPEC-SCNC: 8.2 MG/DL (ref 8.6–10.5)
CHLORIDE SERPL-SCNC: 94 MMOL/L (ref 98–107)
CO2 SERPL-SCNC: 31 MMOL/L (ref 22–29)
CREAT BLD-MCNC: 1.91 MG/DL (ref 0.57–1)
DEPRECATED RDW RBC AUTO: 53.8 FL (ref 37–54)
ERYTHROCYTE [DISTWIDTH] IN BLOOD BY AUTOMATED COUNT: 15.9 % (ref 12.3–15.4)
GFR SERPL CREATININE-BSD FRML MDRD: 25 ML/MIN/1.73
GLUCOSE BLD-MCNC: 109 MG/DL (ref 65–99)
HCT VFR BLD AUTO: 24.2 % (ref 34–46.6)
HGB BLD-MCNC: 7.4 G/DL (ref 12–15.9)
MCH RBC QN AUTO: 28.1 PG (ref 26.6–33)
MCHC RBC AUTO-ENTMCNC: 30.6 G/DL (ref 31.5–35.7)
MCV RBC AUTO: 92 FL (ref 79–97)
PLATELET # BLD AUTO: 287 10*3/MM3 (ref 140–450)
PMV BLD AUTO: 11.7 FL (ref 6–12)
POTASSIUM BLD-SCNC: 4.6 MMOL/L (ref 3.5–5.2)
RBC # BLD AUTO: 2.63 10*6/MM3 (ref 3.77–5.28)
RH BLD: POSITIVE
SODIUM BLD-SCNC: 135 MMOL/L (ref 136–145)
T&S EXPIRATION DATE: NORMAL
TSPOT INTERPRETATION: NEGATIVE
TSPOT NIL CONTROL INTERPRETATION: NORMAL
TSPOT PANEL A: 0
TSPOT PANEL B: 0
TSPOT POS CONTROL INTERPRETATION: NORMAL
WBC NRBC COR # BLD: 7.64 10*3/MM3 (ref 3.4–10.8)

## 2020-01-05 PROCEDURE — 86923 COMPATIBILITY TEST ELECTRIC: CPT

## 2020-01-05 PROCEDURE — 36430 TRANSFUSION BLD/BLD COMPNT: CPT

## 2020-01-05 PROCEDURE — 97530 THERAPEUTIC ACTIVITIES: CPT

## 2020-01-05 PROCEDURE — 85027 COMPLETE CBC AUTOMATED: CPT | Performed by: INTERNAL MEDICINE

## 2020-01-05 PROCEDURE — 25010000002 ENOXAPARIN PER 10 MG: Performed by: INTERNAL MEDICINE

## 2020-01-05 PROCEDURE — 86850 RBC ANTIBODY SCREEN: CPT | Performed by: INTERNAL MEDICINE

## 2020-01-05 PROCEDURE — 86900 BLOOD TYPING SEROLOGIC ABO: CPT

## 2020-01-05 PROCEDURE — 94760 N-INVAS EAR/PLS OXIMETRY 1: CPT

## 2020-01-05 PROCEDURE — 97110 THERAPEUTIC EXERCISES: CPT

## 2020-01-05 PROCEDURE — 86900 BLOOD TYPING SEROLOGIC ABO: CPT | Performed by: INTERNAL MEDICINE

## 2020-01-05 PROCEDURE — 86901 BLOOD TYPING SEROLOGIC RH(D): CPT | Performed by: INTERNAL MEDICINE

## 2020-01-05 PROCEDURE — 94799 UNLISTED PULMONARY SVC/PX: CPT

## 2020-01-05 PROCEDURE — 80048 BASIC METABOLIC PNL TOTAL CA: CPT | Performed by: INTERNAL MEDICINE

## 2020-01-05 PROCEDURE — P9016 RBC LEUKOCYTES REDUCED: HCPCS

## 2020-01-05 RX ORDER — LOPERAMIDE HYDROCHLORIDE 2 MG/1
4 CAPSULE ORAL ONCE
Status: COMPLETED | OUTPATIENT
Start: 2020-01-05 | End: 2020-01-05

## 2020-01-05 RX ADMIN — SERTRALINE 150 MG: 100 TABLET, FILM COATED ORAL at 08:20

## 2020-01-05 RX ADMIN — POLYETHYLENE GLYCOL (3350) 17 G: 17 POWDER, FOR SOLUTION ORAL at 08:20

## 2020-01-05 RX ADMIN — GABAPENTIN 600 MG: 300 CAPSULE ORAL at 21:06

## 2020-01-05 RX ADMIN — SODIUM CHLORIDE, PRESERVATIVE FREE 10 ML: 5 INJECTION INTRAVENOUS at 08:20

## 2020-01-05 RX ADMIN — LIDOCAINE 1 PATCH: 50 PATCH CUTANEOUS at 17:03

## 2020-01-05 RX ADMIN — LOPERAMIDE HYDROCHLORIDE 4 MG: 2 CAPSULE ORAL at 18:52

## 2020-01-05 RX ADMIN — ISOSORBIDE MONONITRATE 90 MG: 30 TABLET, EXTENDED RELEASE ORAL at 08:20

## 2020-01-05 RX ADMIN — SODIUM CHLORIDE, PRESERVATIVE FREE 10 ML: 5 INJECTION INTRAVENOUS at 21:06

## 2020-01-05 RX ADMIN — BUDESONIDE AND FORMOTEROL FUMARATE DIHYDRATE 2 PUFF: 160; 4.5 AEROSOL RESPIRATORY (INHALATION) at 20:04

## 2020-01-05 RX ADMIN — SENNOSIDES AND DOCUSATE SODIUM 2 TABLET: 8.6; 5 TABLET ORAL at 08:20

## 2020-01-05 RX ADMIN — OXYCODONE HYDROCHLORIDE AND ACETAMINOPHEN 1 TABLET: 10; 325 TABLET ORAL at 22:04

## 2020-01-05 RX ADMIN — PANTOPRAZOLE SODIUM 40 MG: 40 TABLET, DELAYED RELEASE ORAL at 08:20

## 2020-01-05 RX ADMIN — OXYCODONE HYDROCHLORIDE AND ACETAMINOPHEN 1 TABLET: 10; 325 TABLET ORAL at 16:19

## 2020-01-05 RX ADMIN — PANTOPRAZOLE SODIUM 40 MG: 40 TABLET, DELAYED RELEASE ORAL at 21:06

## 2020-01-05 RX ADMIN — OXYCODONE HYDROCHLORIDE AND ACETAMINOPHEN 1 TABLET: 10; 325 TABLET ORAL at 08:23

## 2020-01-05 RX ADMIN — ACETAMINOPHEN 500 MG: 500 TABLET, FILM COATED ORAL at 06:29

## 2020-01-05 RX ADMIN — ACETAMINOPHEN 500 MG: 500 TABLET, FILM COATED ORAL at 01:18

## 2020-01-05 RX ADMIN — ENOXAPARIN SODIUM 30 MG: 30 INJECTION SUBCUTANEOUS at 18:52

## 2020-01-05 RX ADMIN — BUDESONIDE AND FORMOTEROL FUMARATE DIHYDRATE 2 PUFF: 160; 4.5 AEROSOL RESPIRATORY (INHALATION) at 07:33

## 2020-01-05 RX ADMIN — ATENOLOL 25 MG: 25 TABLET ORAL at 08:20

## 2020-01-05 RX ADMIN — ACETAMINOPHEN 500 MG: 500 TABLET, FILM COATED ORAL at 21:05

## 2020-01-05 RX ADMIN — OXYCODONE HYDROCHLORIDE AND ACETAMINOPHEN 1 TABLET: 10; 325 TABLET ORAL at 12:39

## 2020-01-05 RX ADMIN — ATORVASTATIN CALCIUM 80 MG: 40 TABLET, FILM COATED ORAL at 21:06

## 2020-01-05 NOTE — PLAN OF CARE
Problem: Patient Care Overview  Goal: Plan of Care Review  Outcome: Ongoing (interventions implemented as appropriate)  Flowsheets (Taken 1/5/2020 0602)  Progress: improving  Plan of Care Reviewed With: patient  Note:   VSS. NV check wnl, ppp. Pt continue to c/o pain w/movement, pt also has chronic pain. Has fair bed mobility but takes a long time to move. Up w/assist of 2 to bs commode. Safety maintained.

## 2020-01-05 NOTE — PROGRESS NOTES
[facility] Medicine Services  INPATIENT PROGRESS NOTE    Patient Name: Roberta Persaud  Date of Admission: 1/1/2020  Today's Date: 01/05/20  Length of Stay: 4  Primary Care Physician: Provider, No Known    Subjective   Chief Complaint: pelvic pain      HPI:      Patient seen and examined at bedside.  Patient overall doing well.  Patient sitting up in the chair.  Patient denies any significant pain.  Pain is controlled with current pain medication.  Hemoglobin was noted to be 7.4 this morning, we will transfuse 1 unit of PRBCs.      Review of Systems   Constitutional: Positive for fatigue. Negative for activity change, appetite change and chills.   Respiratory: Negative for cough and shortness of breath.    Cardiovascular: Negative for chest pain and palpitations.   Gastrointestinal: Negative for abdominal distention and diarrhea.   Musculoskeletal: Positive for arthralgias and back pain.        Pelvic pain   Neurological: Positive for weakness.        All pertinent negatives and positives are as above. All other systems have been reviewed and are negative unless otherwise stated.     Objective    Temp:  [98.4 °F (36.9 °C)-98.7 °F (37.1 °C)] 98.4 °F (36.9 °C)  Heart Rate:  [61-65] 64  Resp:  [16-18] 16  BP: (107-113)/(46-47) 110/47  Physical Exam   Constitutional: She is oriented to person, place, and time. No distress.   Son at bedside; sitting up in chair    HENT:   Head: Normocephalic and atraumatic.   Eyes: Conjunctivae are normal. No scleral icterus.   Neck: Neck supple. No JVD present.   Cardiovascular: Normal rate and regular rhythm.   Murmur heard.  Pulmonary/Chest: Effort normal. She has no wheezes. She has no rales.   Abdominal: Soft. Bowel sounds are normal. She exhibits no distension and no mass. There is no tenderness. There is no guarding.   Musculoskeletal: She exhibits no edema.   Neurological: She is alert and oriented to person, place, and time.   Skin: Skin  is warm and dry. She is not diaphoretic. No erythema.   Psychiatric: She has a normal mood and affect. Her behavior is normal.   Nursing note and vitals reviewed.          Results Review:  I have reviewed the labs, radiology results, and diagnostic studies.    Laboratory Data:   Results from last 7 days   Lab Units 01/05/20  0439 01/03/20  0602 01/02/20  0523   WBC 10*3/mm3 7.64 7.96 8.75   HEMOGLOBIN g/dL 7.4* 8.4* 9.3*   HEMATOCRIT % 24.2* 27.9* 30.9*   PLATELETS 10*3/mm3 287 260 326        Results from last 7 days   Lab Units 01/05/20  0439 01/04/20  1109 01/03/20  0602  01/01/20  0151   SODIUM mmol/L 135* 135* 139   < > 144   POTASSIUM mmol/L 4.6 4.6 4.1   < > 4.0   CHLORIDE mmol/L 94* 94* 95*   < > 97*   CO2 mmol/L 31.0* 31.0* 34.0*   < > 33.0*   BUN mg/dL 48* 43* 42*   < > 35*   CREATININE mg/dL 1.91* 1.77* 2.14*   < > 2.13*   CALCIUM mg/dL 8.2* 8.4* 8.4*   < > 9.3   BILIRUBIN mg/dL  --   --   --   --  0.2   ALK PHOS U/L  --   --   --   --  40   ALT (SGPT) U/L  --   --   --   --  17   AST (SGOT) U/L  --   --   --   --  29   GLUCOSE mg/dL 109* 143* 111*   < > 101*    < > = values in this interval not displayed.       Culture Data:   No results found for: BLOODCX, URINECX, WOUNDCX, MRSACX, RESPCX, STOOLCX    Radiology Data:   Imaging Results (Last 24 Hours)     ** No results found for the last 24 hours. **          I have reviewed the patient's current medications.     Assessment/Plan     Active Hospital Problems    Diagnosis   • Closed displaced fracture of pelvis (CMS/HCC)   • GERD (gastroesophageal reflux disease)   • Coronary artery disease   • Acute pulmonary edema (CMS/HCC)   • COPD (chronic obstructive pulmonary disease) (CMS/HCC)   • Acute kidney injury (CMS/HCC)   • Anemia       Plan:  1.  Enoxaparin 30 mg daily for DVT PPx - Would like to switch to xarelto 10 if creatinine continues to improve   2.  Schedule tylenol 500 mg q6h  3.  One dose of ibuprofen 600 mg   4.  Continue morphine to 4 mg IV PRN    5.  Continue frequency of PO pain medication, percocet 10 mg q4h PRN   6.  Lidoderm patch  7.  Bowel regimen   8.  PT/OT  9.  SW consult for placement   10.  Continue docusate-senna to 2 tablets BID   11.  Patient had a BM with suppository   12.  AM CBC and BMP   13.  Transfuse 1 units of pRBCs today       Case discussed with bedside RN, son, and .      Weight-bearing status TTWB               Discharge Planning: I expect the patient to be discharged to SNF in 1-2 days.  Awaiting TB test.    Sami Griffin MD   01/05/20   12:13 PM

## 2020-01-05 NOTE — PROGRESS NOTES
Continued Stay Note   [facility]     Patient Name: Roberta Persaud  MRN: [id number]  Today's Date: 1/5/2020    Admit Date: 1/1/2020    Discharge Plan     Row Name 01/05/20 1432       Plan    Plan Comments  SW received phone call that pt's son would like to discuss discharge plan for tomorrow. SW attempted to call in room and call sons number with no answer. SW will attempt to answer questions at a later time.         Discharge Codes    No documentation.             Merlyn Roach

## 2020-01-05 NOTE — PLAN OF CARE
Problem: Patient Care Overview  Goal: Plan of Care Review  Flowsheets (Taken 1/5/2020 1143)  Progress: improving  Plan of Care Reviewed With: patient  Outcome Summary: Pt seems stronger today. Bed mobility min x1 needing assist w/ RLE. sit-stand min x1 Pt able to take few steps to bsc then steps to chair w/ cues for maintaining WB status and help w/ AD. Pt able to ROM w/ RLE. discussed w/ son car transfer and safety travel concerns for tomorrow

## 2020-01-06 VITALS
BODY MASS INDEX: 21.33 KG/M2 | TEMPERATURE: 98.5 F | RESPIRATION RATE: 17 BRPM | HEART RATE: 59 BPM | SYSTOLIC BLOOD PRESSURE: 117 MMHG | HEIGHT: 65 IN | DIASTOLIC BLOOD PRESSURE: 54 MMHG | OXYGEN SATURATION: 96 % | WEIGHT: 128 LBS

## 2020-01-06 LAB
ABO + RH BLD: NORMAL
BH BB BLOOD EXPIRATION DATE: NORMAL
BH BB BLOOD TYPE BARCODE: 6200
BH BB DISPENSE STATUS: NORMAL
BH BB PRODUCT CODE: NORMAL
BH BB UNIT NUMBER: NORMAL
UNIT  ABO: NORMAL
UNIT  RH: NORMAL

## 2020-01-06 PROCEDURE — 94799 UNLISTED PULMONARY SVC/PX: CPT

## 2020-01-06 PROCEDURE — 97530 THERAPEUTIC ACTIVITIES: CPT

## 2020-01-06 RX ORDER — FUROSEMIDE 40 MG/1
40 TABLET ORAL DAILY PRN
Start: 2020-01-06 | End: 2020-01-06 | Stop reason: SDUPTHER

## 2020-01-06 RX ORDER — ACETAMINOPHEN 500 MG
500 TABLET ORAL EVERY 6 HOURS
Start: 2020-01-06

## 2020-01-06 RX ORDER — GABAPENTIN 300 MG/1
300 CAPSULE ORAL 2 TIMES DAILY
Qty: 6 CAPSULE | Refills: 0 | Status: SHIPPED | OUTPATIENT
Start: 2020-01-06 | End: 2020-01-09

## 2020-01-06 RX ORDER — FUROSEMIDE 40 MG/1
40 TABLET ORAL DAILY PRN
Start: 2020-01-06

## 2020-01-06 RX ORDER — OXYCODONE AND ACETAMINOPHEN 10; 325 MG/1; MG/1
1 TABLET ORAL EVERY 6 HOURS PRN
Qty: 12 TABLET | Refills: 0 | Status: SHIPPED | OUTPATIENT
Start: 2020-01-06 | End: 2020-01-09

## 2020-01-06 RX ORDER — IPRATROPIUM BROMIDE AND ALBUTEROL SULFATE 2.5; .5 MG/3ML; MG/3ML
3 SOLUTION RESPIRATORY (INHALATION) EVERY 4 HOURS PRN
Qty: 360 ML
Start: 2020-01-06 | End: 2020-01-07

## 2020-01-06 RX ORDER — LIDOCAINE 50 MG/G
1 PATCH TOPICAL
Start: 2020-01-06

## 2020-01-06 RX ADMIN — PANTOPRAZOLE SODIUM 40 MG: 40 TABLET, DELAYED RELEASE ORAL at 09:50

## 2020-01-06 RX ADMIN — ACETAMINOPHEN 500 MG: 500 TABLET, FILM COATED ORAL at 00:26

## 2020-01-06 RX ADMIN — ACETAMINOPHEN 500 MG: 500 TABLET, FILM COATED ORAL at 06:04

## 2020-01-06 RX ADMIN — ISOSORBIDE MONONITRATE 90 MG: 30 TABLET, EXTENDED RELEASE ORAL at 09:50

## 2020-01-06 RX ADMIN — OXYCODONE HYDROCHLORIDE AND ACETAMINOPHEN 1 TABLET: 10; 325 TABLET ORAL at 09:50

## 2020-01-06 RX ADMIN — BUDESONIDE AND FORMOTEROL FUMARATE DIHYDRATE 2 PUFF: 160; 4.5 AEROSOL RESPIRATORY (INHALATION) at 06:41

## 2020-01-06 RX ADMIN — SERTRALINE 150 MG: 100 TABLET, FILM COATED ORAL at 09:50

## 2020-01-06 NOTE — DISCHARGE SUMMARY
[facility]  DISCHARGE SUMMARY       Date of Admission: 1/1/2020  Date of Discharge:  1/6/2020  Primary Care Physician: Provider, No Known    Presenting Problem/History of Present Illness:  Hip pain    Final Discharge Diagnoses:  Active Hospital Problems    Diagnosis   • Closed displaced fracture of pelvis (CMS/HCC)   • GERD (gastroesophageal reflux disease)   • Coronary artery disease   • Acute pulmonary edema (CMS/HCC)   • COPD (chronic obstructive pulmonary disease) (CMS/HCC)   • Acute kidney injury (CMS/HCC)   • Anemia       Consults: Orthopedics    Procedures Performed: None    Pertinent Test Results:   Imaging Results (Last 7 Days)     Procedure Component Value Units Date/Time    XR Chest 1 View [[id number]] Collected:  01/01/20 0749     Updated:  01/01/20 0753    Narrative:       EXAMINATION:  XR CHEST 1 VW-  1/1/2020 2:10 AM CST     HISTORY: Chest pain.     COMPARISON: 03/24/2017.     FINDINGS:  There is cardiomegaly with vascular redistribution. There are  interstitial infiltrates. There is chronic bronchial wall thickening.  There has been prior median sternotomy.       Impression:       1. Cardiomegaly with vascular redistribution.  2. Interstitial infiltrates, likely edema.  3. Stable chronic bronchial wall thickening.        This report was finalized on 01/01/2020 07:50 by Dr. Rodrick Byrne MD.    XR Hip With or Without Pelvis 2 - 3 View Right [[id number]] Collected:  01/01/20 0747     Updated:  01/01/20 0752    Narrative:       EXAMINATION:  XR HIP W OR WO PELVIS 2-3 VIEW RIGHT-  1/1/2020 2:10 AM  CST     HISTORY: The patient fell. Right hip pain.     COMPARISON: No comparison study.     TECHNIQUE: AP and crosstable lateral views of the right hip were  supplemented with an AP image of the pelvis.     FINDINGS: There is a comminuted acute fracture of the superior pubic  ramus on the right. There is also an inferior pubic ramus fracture on  the right.  There has been prior internal repair of a right proximal  femur fracture that appears to be well healed.       Impression:       1. Acute fractures of the superior and inferior pubic rami on the right  side.  2. Chronic proximal right femur fracture with internal repair.  This report was finalized on 01/01/2020 07:49 by Dr. Rodrick Byrne MD.    CT Thoracic Spine Without Contrast [[id number]] Collected:  01/01/20 0734     Updated:  01/01/20 0744    Narrative:       EXAMINATION:  CT THORACIC SPINE WO CONTRAST-  1/1/2020 2:56 AM CST     HISTORY: The patient fell. Mid back pain.     TECHNIQUE: Spiral CT was performed of the thoracic spine. Sagittal and  coronal images were reconstructed.     DLP: 850 mGy-cm. Automated dosage control was utilized.     COMPARISON: No comparison study.     FINDINGS: There is atheromatous disease of the thoracic aorta and  coronary arteries. The pulmonary arteries are dilated. There are  compression fractures at T4, T6 and T9. The fractures at T4 and T6 are  probably chronic. There has been prior kyphoplasty at T9. There is  chronic compression deformity of L2 that is only partially included on  this study. There has been kyphoplasty at L2. No acute appearing  fracture is identified. There is disc degeneration at multiple thoracic  levels. There are degenerative changes in the visualized cervical spine  most severe at C4-5 and C5-6. Please see the cervical spine CT report  separately.       Impression:       1. Thoracic spine compression deformities, as described.  2. Degenerative disc disease at multiple levels.  3. Atheromatous disease of the thoracic aorta and coronary arteries.  Dilated pulmonary arteries.  This report was finalized on 01/01/2020 07:41 by Dr. Rodrick Byrne MD.    CT Cervical Spine Without Contrast [[id number]] Collected:  01/01/20 0729     Updated:  01/01/20 0737    Narrative:       EXAMINATION:  CT CERVICAL SPINE WO CONTRAST-  1/1/2020 2:56 AM CST     HISTORY: The  patient fell. Neck pain.     TECHNIQUE: Spiral CT was performed of the cervical spine. Sagittal and  coronal images were reconstructed.     DLP: 202 mGy-cm. Automated dosage control was utilized.     COMPARISON: No comparison study.     FINDINGS: There is no evidence of cervical spine fracture. There is  severe narrowing of the space between the anterior arch of C1 and the  dens. There is an os odontoideum. There is calcification of the carotid  arteries in the neck bilaterally. The bones are demineralized.     At C2-3, the disc is fairly well-maintained. There is facet arthropathy.  There is no spinal or foraminal narrowing.     At C3-4, there is disc narrowing with spondylitic and uncinate spurring.  There is facet arthropathy and minimal anterior subluxation of C3  compared to C4. There is mild narrowing of the central spinal canal.  There is mild to moderate bilateral foraminal narrowing.     At C4-5, there is disc narrowing with spondylitic and uncinate spurring  producing dural sac compression. There is mild narrowing of the central  canal. There is facet arthropathy on the right. There is severe right  and mild left-sided foraminal stenosis.     At C5-6, there is disc narrowing with spondylitic and uncinate spurring.  There is facet arthropathy. There is mild to moderate bilateral  foraminal stenosis. There is mild narrowing of the central canal.     At C6-7, there is disc narrowing with spondylitic and uncinate spurring.  There is mild narrowing of the central spinal canal. There is mild to  moderate bilateral foraminal stenosis.       Impression:       1. No evidence of cervical spine fracture.  2. Degenerative changes, as described.     This report was finalized on 01/01/2020 07:34 by Dr. Rodrick Byrne MD.    CT Head Without Contrast [[id number]] Collected:  01/01/20 0723     Updated:  01/01/20 0728    Narrative:       EXAMINATION:  CT HEAD WO CONTRAST-  1/1/2020 2:56 AM CST     HISTORY: The patient fell.  Head injury.     TECHNIQUE: Multiple axial images were obtained through the brain without  contrast infusion. Multiplanar images were reconstructed.     DLP: 551 mGy-cm. Automated dosage control was utilized.     COMPARISON: 03/24/2017.     FINDINGS: There are no hemorrhage, edema or mass effect. There is mild  to moderate atrophy with associated ventricular prominence. There is low  density in the hemispheric white matter. There is bilateral basal  ganglia and cerebellar calcification. Vascular calcification is noted.  The visualized paranasal sinuses and mastoid air cells are clear.       Impression:       1. No acute hemorrhage, edema or mass effect.  2. Atrophy and chronic ischemic white matter disease, stable.     This report was finalized on 01/01/2020 07:25 by Dr. Rodrick Byrne MD.        Lab Results (last 7 days)     Procedure Component Value Units Date/Time    TSPOT [[id number]] Collected:  01/03/20 0646    Specimen:  Blood Updated:  01/05/20 1526     TSPOTTB Negative     T-SPOT Panel A 0     T-SPOT Panel B 0     TSPOT NIL CONTROL INTERP Passed     TSPOT POS CONTROL INTERP Passed    Basic Metabolic Panel [[id number]]  (Abnormal) Collected:  01/05/20 0439    Specimen:  Blood Updated:  01/05/20 0521     Glucose 109 mg/dL      BUN 48 mg/dL      Creatinine 1.91 mg/dL      Sodium 135 mmol/L      Potassium 4.6 mmol/L      Chloride 94 mmol/L      CO2 31.0 mmol/L      Calcium 8.2 mg/dL      eGFR Non African Amer 25 mL/min/1.73      BUN/Creatinine Ratio 25.1     Anion Gap 10.0 mmol/L     Narrative:       GFR Normal >60  Chronic Kidney Disease <60  Kidney Failure <15      CBC (No Diff) [[id number]]  (Abnormal) Collected:  01/05/20 0439    Specimen:  Blood Updated:  01/05/20 0511     WBC 7.64 10*3/mm3      RBC 2.63 10*6/mm3      Hemoglobin 7.4 g/dL      Hematocrit 24.2 %      MCV 92.0 fL      MCH 28.1 pg      MCHC 30.6 g/dL      RDW 15.9 %      RDW-SD 53.8 fl      MPV 11.7 fL      Platelets 287 10*3/mm3     Renal  Function Panel [[id number]]  (Abnormal) Collected:  01/04/20 1109    Specimen:  Blood Updated:  01/04/20 1150     Glucose 143 mg/dL      BUN 43 mg/dL      Creatinine 1.77 mg/dL      Sodium 135 mmol/L      Potassium 4.6 mmol/L      Chloride 94 mmol/L      CO2 31.0 mmol/L      Calcium 8.4 mg/dL      Albumin 3.60 g/dL      Phosphorus 4.2 mg/dL      Anion Gap 10.0 mmol/L      BUN/Creatinine Ratio 24.3     eGFR Non African Amer 27 mL/min/1.73     Narrative:       GFR Normal >60  Chronic Kidney Disease <60  Kidney Failure <15      Basic Metabolic Panel [[id number]]  (Abnormal) Collected:  01/03/20 0602    Specimen:  Blood Updated:  01/03/20 0648     Glucose 111 mg/dL      BUN 42 mg/dL      Creatinine 2.14 mg/dL      Sodium 139 mmol/L      Potassium 4.1 mmol/L      Chloride 95 mmol/L      CO2 34.0 mmol/L      Calcium 8.4 mg/dL      eGFR Non African Amer 22 mL/min/1.73      BUN/Creatinine Ratio 19.6     Anion Gap 10.0 mmol/L     Narrative:       GFR Normal >60  Chronic Kidney Disease <60  Kidney Failure <15      CBC (No Diff) [[id number]]  (Abnormal) Collected:  01/03/20 0602    Specimen:  Blood Updated:  01/03/20 0631     WBC 7.96 10*3/mm3      RBC 2.97 10*6/mm3      Hemoglobin 8.4 g/dL      Hematocrit 27.9 %      MCV 93.9 fL      MCH 28.3 pg      MCHC 30.1 g/dL      RDW 15.9 %      RDW-SD 54.7 fl      MPV 11.2 fL      Platelets 260 10*3/mm3     Basic Metabolic Panel [[id number]]  (Abnormal) Collected:  01/02/20 0523    Specimen:  Blood Updated:  01/02/20 0644     Glucose 102 mg/dL      BUN 32 mg/dL      Creatinine 1.57 mg/dL      Sodium 145 mmol/L      Potassium 4.3 mmol/L      Chloride 99 mmol/L      CO2 36.0 mmol/L      Calcium 8.5 mg/dL      eGFR Non African Amer 31 mL/min/1.73      BUN/Creatinine Ratio 20.4     Anion Gap 10.0 mmol/L     Narrative:       GFR Normal >60  Chronic Kidney Disease <60  Kidney Failure <15      CBC (No Diff) [[id number]]  (Abnormal) Collected:  01/02/20 0523    Specimen:  Blood Updated:  01/02/20  0627     WBC 8.75 10*3/mm3      RBC 3.31 10*6/mm3      Hemoglobin 9.3 g/dL      Hematocrit 30.9 %      MCV 93.4 fL      MCH 28.1 pg      MCHC 30.1 g/dL      RDW 16.2 %      RDW-SD 55.2 fl      MPV 11.9 fL      Platelets 326 10*3/mm3     BNP [[id number]]  (Abnormal) Collected:  01/01/20 0151    Specimen:  Blood Updated:  01/01/20 0305     proBNP 2,628.0 pg/mL     Narrative:       Among patients with dyspnea, NT-proBNP is highly sensitive for the detection of acute congestive heart failure. In addition NT-proBNP of <300 pg/ml effectively rules out acute congestive heart failure with 99% negative predictive value.      Comprehensive Metabolic Panel [[id number]]  (Abnormal) Collected:  01/01/20 0151    Specimen:  Blood Updated:  01/01/20 0222     Glucose 101 mg/dL      BUN 35 mg/dL      Creatinine 2.13 mg/dL      Sodium 144 mmol/L      Potassium 4.0 mmol/L      Comment: Specimen hemolyzed.  Results may be affected.        Chloride 97 mmol/L      CO2 33.0 mmol/L      Calcium 9.3 mg/dL      Total Protein 7.2 g/dL      Albumin 4.30 g/dL      ALT (SGPT) 17 U/L      Comment: Specimen hemolyzed.  Results may be affected.        AST (SGOT) 29 U/L      Comment: Specimen hemolyzed.  Results may be affected.        Alkaline Phosphatase 40 U/L      Total Bilirubin 0.2 mg/dL      eGFR Non African Amer 22 mL/min/1.73      Globulin 2.9 gm/dL      A/G Ratio 1.5 g/dL      BUN/Creatinine Ratio 16.4     Anion Gap 14.0 mmol/L     Narrative:       GFR Normal >60  Chronic Kidney Disease <60  Kidney Failure <15      Troponin [[id number]]  (Normal) Collected:  01/01/20 0151    Specimen:  Blood Updated:  01/01/20 0218     Troponin T 0.017 ng/mL     Narrative:       Troponin T Reference Range:  <= 0.03 ng/mL-   Negative for AMI  >0.03 ng/mL-     Abnormal for myocardial necrosis.  Clinicians would have to utilize clinical acumen, EKG, Troponin and serial changes to determine if it is an Acute Myocardial Infarction or myocardial injury due to an  underlying chronic condition.     aPTT [[id number]]  (Normal) Collected:  20    Specimen:  Blood Updated:  20     PTT 29.1 seconds     Protime-INR [[id number]]  (Abnormal) Collected:  20    Specimen:  Blood Updated:  20     Protime 11.8 Seconds      INR 0.84    CBC & Differential [[id number]] Collected:  20    Specimen:  Blood Updated:  20    Narrative:       The following orders were created for panel order CBC & Differential.  Procedure                               Abnormality         Status                     ---------                               -----------         ------                     CBC Auto Differential[[id number]]        Abnormal            Final result                 Please view results for these tests on the individual orders.    CBC Auto Differential [[id number]]  (Abnormal) Collected:  20    Specimen:  Blood Updated:  20     WBC 17.27 10*3/mm3      RBC 3.88 10*6/mm3      Hemoglobin 10.6 g/dL      Hematocrit 35.3 %      MCV 91.0 fL      MCH 27.3 pg      MCHC 30.0 g/dL      RDW 16.4 %      RDW-SD 54.4 fl      MPV 11.4 fL      Platelets 417 10*3/mm3      Neutrophil % 83.3 %      Lymphocyte % 6.8 %      Monocyte % 6.3 %      Eosinophil % 1.9 %      Basophil % 0.5 %      Immature Grans % 1.2 %      Neutrophils, Absolute 14.40 10*3/mm3      Lymphocytes, Absolute 1.17 10*3/mm3      Monocytes, Absolute 1.09 10*3/mm3      Eosinophils, Absolute 0.32 10*3/mm3      Basophils, Absolute 0.09 10*3/mm3      Immature Grans, Absolute 0.20 10*3/mm3      nRBC 0.0 /100 WBC         Hospital Course:  The patient is a 88 y.o. female who presented to [facility] with hip pain.  Patient came up from [place] for a .  The patient had a very tiring day, and she actually fell asleep on the toilet.  Patient slipped off and landed on the ground on her right side.  Patient was found to have a closed pelvic fracture.  Per the  "x-ray of the hip and pelvis, the patient had acute fractures of the superior and inferior pubic rami on the right side, and she has a chronic proximal right femur fracture with internal repair.  Orthopedics evaluated the patient, and deemed that the patient can be toe-touch weightbearing on the right side.  Indicated no indication for surgery.  The patient was also noted to have an acute kidney injury, she was treated with some gentle IV fluids.  Patient also was found to be anemic, and received 1 unit of packed red blood cells.  The patient overall continued to do well.  She worked to physical therapy and Occupational Therapy with some improvement.  Initially pain control was very difficult, but the pain regimen she has on currently has improved her pain dramatically.  The patient is looking forward to being discharged and getting back to her hometown.  Patient had a T spot test that was found to be negative.  Patient has no risk factors for tuberculosis.    Lovenox for 28 days for DVT PPx.     Physical Exam on Discharge:  /54 (BP Location: Left arm, Patient Position: Sitting)   Pulse 59   Temp 98.5 °F (36.9 °C) (Oral)   Resp 17   Ht 165.1 cm (65\")   Wt 58.1 kg (128 lb)   SpO2 96%   BMI 21.30 kg/m²   Physical Exam  Constitutional: She is oriented to person, place, and time. No distress.   Son at bedside; sitting up in chair    HENT:   Head: Normocephalic and atraumatic.   Eyes: Conjunctivae are normal. No scleral icterus.   Neck: Neck supple. No JVD present.   Cardiovascular: Normal rate and regular rhythm.   Murmur heard.  Pulmonary/Chest: Effort normal. She has no wheezes. She has no rales.   Abdominal: Soft. Bowel sounds are normal. She exhibits no distension and no mass. There is no tenderness. There is no guarding.   Musculoskeletal: She exhibits no edema.   Neurological: She is alert and oriented to person, place, and time.   Skin: Skin is warm and dry. She is not diaphoretic. No erythema. "   Psychiatric: She has a normal mood and affect. Her behavior is normal.   Nursing note and vitals reviewed.    Condition on Discharge: Stable    Discharge Disposition:  Skilled Nursing Facility (DC - External)    Discharge Medications:     Discharge Medications      New Medications      Instructions Start Date   acetaminophen 500 MG tablet  Commonly known as:  TYLENOL   500 mg, Oral, Every 6 Hours      enoxaparin 30 MG/0.3ML solution syringe  Commonly known as:  LOVENOX   30 mg, Subcutaneous, Every 24 Hours, COMPLETE FOR 28 DAYS      ipratropium-albuterol 0.5-2.5 mg/3 ml nebulizer  Commonly known as:  DUO-NEB   3 mL, Nebulization, Every 4 Hours PRN      lidocaine 5 %  Commonly known as:  LIDODERM   1 patch, Transdermal, Every 24 Hours Scheduled, Remove & Discard patch within 12 hours or as directed by MD         Changes to Medications      Instructions Start Date   furosemide 40 MG tablet  Commonly known as:  LASIX  What changed:    · how much to take  · when to take this  · reasons to take this  · additional instructions   40 mg, Oral, Daily PRN      gabapentin 300 MG capsule  Commonly known as:  NEURONTIN  What changed:  how much to take   300 mg, Oral, 2 Times Daily         Continue These Medications      Instructions Start Date   aspirin 81 MG EC tablet   81 mg, Oral, Daily      atenolol 25 MG tablet  Commonly known as:  TENORMIN   12.5 mg, Oral, Daily      atorvastatin 80 MG tablet  Commonly known as:  LIPITOR   80 mg, Oral, Daily      budesonide-formoterol 160-4.5 MCG/ACT inhaler  Commonly known as:  SYMBICORT   2 puffs, Inhalation, 2 Times Daily - RT      hydrOXYzine 10 MG tablet  Commonly known as:  ATARAX   10 mg, Oral, 3 Times Daily PRN      isosorbide mononitrate 60 MG 24 hr tablet  Commonly known as:  IMDUR   90 mg, Oral, Daily      nitroglycerin 0.4 MG/SPRAY spray  Commonly known as:  NITROLINGUAL   1 spray, Sublingual, Every 5 Minutes PRN      omeprazole 40 MG capsule  Commonly known as:  priLOSEC    40 mg, Oral, 2 Times Daily      oxyCODONE-acetaminophen  MG per tablet  Commonly known as:  PERCOCET   1 tablet, Oral, Every 6 Hours PRN      sertraline 100 MG tablet  Commonly known as:  ZOLOFT   100 mg, Oral, Daily      SPIRIVA RESPIMAT 2.5 MCG/ACT aerosol solution inhaler  Generic drug:  tiotropium bromide monohydrate   2 puffs, Inhalation, Daily - RT      tiZANidine 2 MG tablet  Commonly known as:  ZANAFLEX   2 mg, Oral, Every Night at Bedtime         Stop These Medications    amLODIPine 5 MG tablet  Commonly known as:  NORVASC     diphenhydrAMINE 25 mg capsule  Commonly known as:  BENADRYL     SENNA LAX 8.6 MG tablet  Generic drug:  senna     ZETIA 10 MG tablet  Generic drug:  ezetimibe          Discharge Diet:   Diet Instructions     Diet: Regular; Thin      Discharge Diet:  Regular    Fluid Consistency:  Thin        Activity at Discharge:   Activity Instructions     Activity as Tolerated      TOE TOUCH WEIGHT BEARING RIGHT SIDE          Follow-up Appointments:   No future appointments.    Test Results Pending at Discharge: None    Sami Griffin MD  01/06/20  10:28 AM    Time: 35 minutes.

## 2020-01-06 NOTE — PROGRESS NOTES
Continued Stay Note   [place]     Patient Name: Roberta Persaud  MRN: [id number]  Today's Date: 1/6/2020    Admit Date: 1/1/2020    Discharge Plan     Row Name 01/06/20 1200       Plan    Final Discharge Disposition Code  03 - skilled nursing facility (SNF)    Final Note  PT IS BEING DCD TO [facility] IN [place]. [facility] NOTIFIED. FAXED DC SUMMARY [phone]. PT SON WILL TRANSPORT PT TO [facility]. CALL REPORT NUMBER [phone]        Discharge Codes    No documentation.       Expected Discharge Date and Time     Expected Discharge Date Expected Discharge Time    Jan 6, 2020             DAVID Ge

## 2020-01-06 NOTE — DISCHARGE PLACEMENT REQUEST
"  Attn: Admissions  T-spot is negative and results are attached. Please call Vanessa at [phone] after info received  Thanks!        Robin Persaud (88 y.o. Female)     Date of Birth Social Security Number Address Home Phone MRN    05/31/1931  [address] [place] [address] [phone] [id number]    [employer] Marital Status          Yazidi        Admission Date Admission Type Admitting Provider Attending Provider Department, Room/Bed    1/1/20 Emergency Sami Griffin MD Fleming, John Eric, MD Psychiatric 3A, 355/2    Discharge Date Discharge Disposition Discharge Destination                       Attending Provider:  Sami Griffin MD    Allergies:  Bee Venom, Penicillins, Tetanus Toxoids    Isolation:  None   Infection:  None   Code Status:  CPR    Ht:  165.1 cm (65\")   Wt:  58.1 kg (128 lb)    Admission Cmt:  None   Principal Problem:  None                Active Insurance as of 1/1/2020     Primary Coverage     Payor Plan Insurance Group Employer/Plan Group    MEDICARE MEDICARE A & B      Payor Plan Address Payor Plan Phone Number Payor Plan Fax Number Effective Dates    [address] [phone]  5/1/1996 - None Entered    [facility] [address]       Subscriber Name Subscriber Birth Date Member ID       ROBIN PERSAUD 5/31/1931 [id number]           Secondary Coverage     Payor Plan Insurance Group Employer/Plan Group    [employer] [place] [employer] [place]      Payor Plan Address Payor Plan Phone Number Payor Plan Fax Number Effective Dates    [address] [place] [address] [phone]  5/1/1996 - None Entered    [place] [address]       Subscriber Name Subscriber Birth Date Member ID       ROBIN PERSAUD 5/31/1931 [id number]                 Emergency Contacts      (Rel.) Home Phone Work Phone Mobile Phone    Km Persaud (Son) [phone] -- --              Lab Results (last 24 hours)     Procedure Component Value Units Date/Time    TSPOT " [[id number]] Collected:  01/03/20 0646    Specimen:  Blood Updated:  01/05/20 1526     TSPOTTB Negative     T-SPOT Panel A 0     T-SPOT Panel B 0     TSPOT NIL CONTROL INTERP Passed     TSPOT POS CONTROL INTERP Passed           Physician Progress Notes (last 24 hours) (Notes from 01/05/20 0859 through 01/06/20 0859)      Sami Griffin MD at 01/05/20 1212              [facility] Medicine Services  INPATIENT PROGRESS NOTE    Patient Name: Roberta Persaud  Date of Admission: 1/1/2020  Today's Date: 01/05/20  Length of Stay: 4  Primary Care Physician: Provider, No Known    Subjective   Chief Complaint: pelvic pain      HPI:      Patient seen and examined at bedside.  Patient overall doing well.  Patient sitting up in the chair.  Patient denies any significant pain.  Pain is controlled with current pain medication.  Hemoglobin was noted to be 7.4 this morning, we will transfuse 1 unit of PRBCs.      Review of Systems   Constitutional: Positive for fatigue. Negative for activity change, appetite change and chills.   Respiratory: Negative for cough and shortness of breath.    Cardiovascular: Negative for chest pain and palpitations.   Gastrointestinal: Negative for abdominal distention and diarrhea.   Musculoskeletal: Positive for arthralgias and back pain.        Pelvic pain   Neurological: Positive for weakness.        All pertinent negatives and positives are as above. All other systems have been reviewed and are negative unless otherwise stated.     Objective    Temp:  [98.4 °F (36.9 °C)-98.7 °F (37.1 °C)] 98.4 °F (36.9 °C)  Heart Rate:  [61-65] 64  Resp:  [16-18] 16  BP: (107-113)/(46-47) 110/47  Physical Exam   Constitutional: She is oriented to person, place, and time. No distress.   Son at bedside; sitting up in chair    HENT:   Head: Normocephalic and atraumatic.   Eyes: Conjunctivae are normal. No scleral icterus.   Neck: Neck supple. No JVD present.   Cardiovascular:  Normal rate and regular rhythm.   Murmur heard.  Pulmonary/Chest: Effort normal. She has no wheezes. She has no rales.   Abdominal: Soft. Bowel sounds are normal. She exhibits no distension and no mass. There is no tenderness. There is no guarding.   Musculoskeletal: She exhibits no edema.   Neurological: She is alert and oriented to person, place, and time.   Skin: Skin is warm and dry. She is not diaphoretic. No erythema.   Psychiatric: She has a normal mood and affect. Her behavior is normal.   Nursing note and vitals reviewed.          Results Review:  I have reviewed the labs, radiology results, and diagnostic studies.    Laboratory Data:   Results from last 7 days   Lab Units 01/05/20  0439 01/03/20  0602 01/02/20  0523   WBC 10*3/mm3 7.64 7.96 8.75   HEMOGLOBIN g/dL 7.4* 8.4* 9.3*   HEMATOCRIT % 24.2* 27.9* 30.9*   PLATELETS 10*3/mm3 287 260 326        Results from last 7 days   Lab Units 01/05/20  0439 01/04/20  1109 01/03/20  0602  01/01/20  0151   SODIUM mmol/L 135* 135* 139   < > 144   POTASSIUM mmol/L 4.6 4.6 4.1   < > 4.0   CHLORIDE mmol/L 94* 94* 95*   < > 97*   CO2 mmol/L 31.0* 31.0* 34.0*   < > 33.0*   BUN mg/dL 48* 43* 42*   < > 35*   CREATININE mg/dL 1.91* 1.77* 2.14*   < > 2.13*   CALCIUM mg/dL 8.2* 8.4* 8.4*   < > 9.3   BILIRUBIN mg/dL  --   --   --   --  0.2   ALK PHOS U/L  --   --   --   --  40   ALT (SGPT) U/L  --   --   --   --  17   AST (SGOT) U/L  --   --   --   --  29   GLUCOSE mg/dL 109* 143* 111*   < > 101*    < > = values in this interval not displayed.       Culture Data:   No results found for: BLOODCX, URINECX, WOUNDCX, MRSACX, RESPCX, STOOLCX    Radiology Data:   Imaging Results (Last 24 Hours)     ** No results found for the last 24 hours. **          I have reviewed the patient's current medications.     Assessment/Plan     Active Hospital Problems    Diagnosis   • Closed displaced fracture of pelvis (CMS/HCC)   • GERD (gastroesophageal reflux disease)   • Coronary artery disease    • Acute pulmonary edema (CMS/HCC)   • COPD (chronic obstructive pulmonary disease) (CMS/HCC)   • Acute kidney injury (CMS/HCC)   • Anemia       Plan:  1.  Enoxaparin 30 mg daily for DVT PPx - Would like to switch to xarelto 10 if creatinine continues to improve   2.  Schedule tylenol 500 mg q6h  3.  One dose of ibuprofen 600 mg   4.  Continue morphine to 4 mg IV PRN   5.  Continue frequency of PO pain medication, percocet 10 mg q4h PRN   6.  Lidoderm patch  7.  Bowel regimen   8.  PT/OT  9.  SW consult for placement   10.  Continue docusate-senna to 2 tablets BID   11.  Patient had a BM with suppository   12.  AM CBC and BMP   13.  Transfuse 1 units of pRBCs today       Case discussed with bedside RN, son, and .      Weight-bearing status TTWB               Discharge Planning: I expect the patient to be discharged to SNF in 1-2 days.  Awaiting TB test.    Sami Griffin MD   01/05/20   12:13 PM    Electronically signed by Sami Griffin MD at 01/05/20 1849

## 2020-01-06 NOTE — PROGRESS NOTES
Continued Stay Note   [facility]     Patient Name: Roberta Persaud  MRN: [id number]  Today's Date: 1/6/2020    Admit Date: 1/1/2020    Discharge Plan     Row Name 01/06/20 0900       Plan    Plan Comments  FAXED NEG TSPOT LAB AND UPDATED PHYSICIAN NOTES TO [facility].         Discharge Codes    No documentation.             DAVID Ge

## 2020-01-06 NOTE — PLAN OF CARE
Problem: Patient Care Overview  Goal: Plan of Care Review  Outcome: Ongoing (interventions implemented as appropriate)  Flowsheets (Taken 1/5/2020 9411)  Progress: improving  Plan of Care Reviewed With: patient  Outcome Summary: Patient up X 2 with RW to BSC/chair.  Multiple diarrhea BMs today, Imodium given.  1 unit PRBC given for Hgb 7.4, tolerating well.  Medicated prn for pain, lidocaine patch to right hip.

## 2020-01-06 NOTE — THERAPY TREATMENT NOTE
Acute Care - Physical Therapy Treatment Note  [facility]     Patient Name: Roberta Persaud  : 1931  MRN: [id number]  Today's Date: 2020  Onset of Illness/Injury or Date of Surgery: 19     Referring Physician: Dr. Alvarado    Admit Date: 2020    Visit Dx:    ICD-10-CM ICD-9-CM   1. Closed displaced fracture of pelvis, unspecified part of pelvis, initial encounter (CMS/HCC) S32.9XXA 808.8   2. KASANDRA (acute kidney injury) (CMS/HCC) N17.9 584.9   3. Leukocytosis, unspecified type D72.829 288.60   4. Impaired mobility Z74.09 799.89   5. Impaired mobility and ADLs Z74.09 799.89     Patient Active Problem List   Diagnosis   • Fever in adult   • Altered mental status   • Closed displaced fracture of pelvis (CMS/HCC)   • GERD (gastroesophageal reflux disease)   • Coronary artery disease   • Acute pulmonary edema (CMS/HCC)   • COPD (chronic obstructive pulmonary disease) (CMS/HCC)   • Acute kidney injury (CMS/HCC)   • Anemia       Therapy Treatment    Rehabilitation Treatment Summary     Row Name 20 1000 20 0836          Treatment Time/Intention    Discipline  physical therapy assistant  -  physical therapy assistant  -     Document Type  therapy note (daily note)  -  --     Subjective Information  complains of;pain;weakness;dyspnea  -  --     Comment  --  pt sleeping,son requested to check back later to review trans   -[facility]     Reason Treatment Not Performed  --  patient/family declined treatment  -[facility]     Existing Precautions/Restrictions  fall;oxygen therapy device and L/min  -  --     Treatment Considerations/Comments  [facility]  --     Recorded by [] Denise Jerome, PTA 20 1038 [] Denise Jerome, PTA 20 0836  [[facility]] Denise Jerome, [facility] 20 0845     Row Name 20 1000             Bed Mobility Assessment/Treatment    Supine-Sit [place] (Bed Mobility)  minimum assist (75% patient effort);verbal cues  -      Sit-Supine [place] (Bed Mobility)   minimum assist (75% patient effort);verbal cues  -AH      Recorded by [] Denise Jerome, PTA 01/06/20 1038      Row Name 01/06/20 1000             Stand Pivot/Stand Step Transfer    Stand Pivot/Stand Step [place]  minimum assist (75% patient effort);verbal cues  -      Assistive Device (Stand Pivot Stand Step Transfer)  walker, front-wheeled  -AH      Recorded by [] Denise Jerome, PTA 01/06/20 1038      Row Name 01/06/20 1000             Toilet Transfer    [place] Level (Toilet Transfer)  contact guard;minimum assist (75% patient effort);verbal cues  -      Assistive Device (Toilet Transfer)  commode, bedside without drop arms;walker, front-wheeled  -AH      Recorded by [] Denise Jerome, \[facility]\"" 01/06/20 1038      Row Name 01/06/20 1000             Positioning and Restraints    Pre-Treatment Position  in bed  -AH      Post Treatment Position  bed  -AH      In Bed  fowlers;call light within reach;encouraged to call for assist;with family/caregiver  -AH      Recorded by [] Denise Jerome, \[facility] [place] [facility]\"" 01/06/20 1038      Row Name 01/06/20 1000             Pain Scale: Numbers Pre/Post-Treatment    Pain Scale: Numbers, Pretreatment  8/10  -AH      Pain Scale: Numbers, Post-Treatment  9/10  -AH      Pain Location - Side  Right  -AH      Pain Location  hip  -AH      Pain Intervention(s)  Medication (See MAR);Repositioned  -AH      Recorded by [] Denise Jerome, \[facility]\"" 01/06/20 1038        User Key  (r) = Recorded By, (t) = Taken By, (c) = Cosigned By    Initials Name Effective Dates Discipline     Denise Jerome, \[facility] [place] [facility]\"" 08/02/16 -  PT                       PT Recommendation and Plan     Plan of Care Reviewed With: patient, son  Progress: improving  Outcome Summary: pt trans to left side of bed min-mod assist, extended time to mobility, sit-stand min assist, pivot to BSC with rwx min assist, then pivot to chair. pt would benefit from rehab  Outcome Measures     Row Name 01/05/20 1100 01/04/20 1051 01/04/20  1000       How much help from another person do you currently need...    Turning from your back to your side while in flat bed without using bedrails?  3  -NW  --  2  -NW    Moving from lying on back to sitting on the side of a flat bed without bedrails?  3  -NW  --  2  -NW    Moving to and from a bed to a chair (including a wheelchair)?  3  -NW  --  3  -NW    Standing up from a chair using your arms (e.g., wheelchair, bedside chair)?  2  -NW  --  3  -NW    Climbing 3-5 steps with a railing?  1  -NW  --  1  -NW    To walk in hospital room?  2  -NW  --  2  -NW    AM-PAC 6 Clicks Score (PT)  14  -NW  --  13  -NW       How much help from another is currently needed...    Putting on and taking off regular lower body clothing?  --  2  -AO  --    Bathing (including washing, rinsing, and drying)  --  2  -AO  --    Toileting (which includes using toilet bed pan or urinal)  --  2  -AO  --    Putting on and taking off regular upper body clothing  --  3  -AO  --    Taking care of personal grooming (such as brushing teeth)  --  4  -AO  --    Eating meals  --  4  -AO  --    AM-PAC 6 Clicks Score (OT)  --  17  -AO  --       Functional Assessment    Outcome Measure Options  AM-PAC 6 Clicks Basic Mobility (PT)  -NW  --  AM-PAC 6 Clicks Basic Mobility (PT)  -NW    Row Name 01/03/20 1400             How much help from another is currently needed...    Putting on and taking off regular lower body clothing?  2  -TS      Bathing (including washing, rinsing, and drying)  2  -TS      Toileting (which includes using toilet bed pan or urinal)  2  -TS      Putting on and taking off regular upper body clothing  3  -TS      Taking care of personal grooming (such as brushing teeth)  4  -TS      Eating meals  4  -TS      AM-PAC 6 Clicks Score (OT)  17  -TS         Functional Assessment    Outcome Measure Options  AM-PAC 6 Clicks Daily Activity (OT)  -TS        User Key  (r) = Recorded By, (t) = Taken By, (c) = Cosigned By    Initials Name  Provider Type     Georgina Orozco, RICKS/L Occupational Therapy Assistant    NW Kimberly Garcia PTA Physical Therapy Assistant    AO Alejo, MILLICENT Reyes/L Occupational Therapy Assistant         Time Calculation:   PT Charges     Row Name 01/06/20 1038             Time Calculation    Start Time  1000  -      Stop Time  1025  -      Time Calculation (min)  25 min  -      PT Received On  01/06/20  -         Time Calculation- PT    Total Timed Code Minutes- PT  25 minute(s)  -         Timed Charges    28053 - PT Therapeutic Activity Minutes  25  -AH        User Key  (r) = Recorded By, (t) = Taken By, (c) = Cosigned By    Initials Name Provider Type     Denise Jerome PTA Physical Therapy Assistant        Therapy Charges for Today     Code Description Service Date Service Provider Modifiers Qty    60822795683 HC PT THERAPEUTIC ACT EA 15 MIN 1/6/2020 Denise Jerome PTA GP 2          PT G-Codes  Outcome Measure Options: AM-PAC 6 Clicks Basic Mobility (PT)  AM-PAC 6 Clicks Score (PT): 14  AM-PAC 6 Clicks Score (OT): 17    Denise Jerome PTA  1/6/2020

## 2020-01-06 NOTE — PLAN OF CARE
Problem: Patient Care Overview  Goal: Plan of Care Review  Outcome: Ongoing (interventions implemented as appropriate)  Flowsheets (Taken 1/6/2020 1519)  Progress: improving  Plan of Care Reviewed With: patient  Note:   VSS. No change in NV status, ppp. Continue to c/o pain,gael to rt hip/groin area,  states prn meds effective. No diarrhea this shift. Up to bsc w/assist of 2. Safety maintained.

## 2020-01-06 NOTE — PLAN OF CARE
Problem: Patient Care Overview  Goal: Plan of Care Review  Outcome: Ongoing (interventions implemented as appropriate)  Flowsheets  Taken 1/6/2020 1242  Progress: improving  Outcome Summary: A&OX4. C/o pain in R hip. PRN pain medication given. VSS. Up x1 slowly with RW. Son @ bedside.  Continue to monitor until pt is d/c. Safety maintained.  Taken 1/6/2020 2247  Plan of Care Reviewed With: patient;son     Problem: Patient Care Overview  Goal: Individualization and Mutuality  Outcome: Ongoing (interventions implemented as appropriate)  Flowsheets (Taken 1/6/2020 1242)  Patient Specific Preferences: d/c to [facility] today

## 2020-01-07 NOTE — THERAPY DISCHARGE NOTE
Acute Care - Physical Therapy Discharge Summary  [facility]       Patient Name: Roberta Persaud  : 1931  MRN: [id number]    Today's Date: 2020  Onset of Illness/Injury or Date of Surgery: 19       Referring Physician: Dr. Alvarado      Admit Date: 2020      PT Recommendation and Plan    Visit Dx:    ICD-10-CM ICD-9-CM   1. Closed displaced fracture of pelvis, unspecified part of pelvis, initial encounter (CMS/[facility]) S32.9XXA 808.8   2. KASANDRA (acute kidney injury) (CMS/[facility]) N17.9 584.9   3. Leukocytosis, unspecified type D72.829 288.60   4. Impaired mobility Z74.09 799.89   5. Impaired mobility and ADLs Z74.09 799.89       Outcome Measures     Row Name 20 1100             How much help from another person do you currently need...    Turning from your back to your side while in flat bed without using bedrails?  3  -NW      Moving from lying on back to sitting on the side of a flat bed without bedrails?  3  -NW      Moving to and from a bed to a chair (including a wheelchair)?  3  -NW      Standing up from a chair using your arms (e.g., wheelchair, bedside chair)?  2  -NW      Climbing 3-5 steps with a railing?  1  -NW      To walk in hospital room?  2  -NW      AM-PAC 6 Clicks Score (PT)  14  -NW         Functional Assessment    Outcome Measure Options  AM-PAC 6 Clicks Basic Mobility (PT)  -NW        User Key  (r) = Recorded By, (t) = Taken By, (c) = Cosigned By    Initials Name Provider Type    NW Kimberly Garcia PTA Physical Therapy Assistant              Rehab Goal Summary     Row Name 20 1318 20 0700          Bed Mobility Goal 1 (PT)    Activity/Assistive Device (Bed Mobility Goal 1, PT)  bed mobility activities, all  -NW  --     [place] Level/Cues Needed (Bed Mobility Goal 1, PT)  standby assist  -NW  --     Time Frame (Bed Mobility Goal 1, PT)  by discharge  -NW  --     Progress/Outcomes (Bed Mobility Goal 1, PT)  goal not met  -NW  --        Transfer Goal 1 (PT)     Activity/Assistive Device (Transfer Goal 1, PT)  sit-to-stand/stand-to-sit;bed-to-chair/chair-to-bed;walker, rolling;car transfer  -NW  --     [place] Level/Cues Needed (Transfer Goal 1, PT)  standby assist  -NW  --     Time Frame (Transfer Goal 1, PT)  by discharge  -NW  --     Barriers (Transfers Goal 1, PT)  TTWB RLE  -NW  --     Progress/Outcome (Transfer Goal 1, PT)  goal not met  -NW  --        Transfer Goal 1 (OT)    Activity/Assistive Device (Transfer Goal 1, OT)  --  sit-to-stand/stand-to-sit;bed-to-chair/chair-to-bed;toilet;tub;tub bench  -TS     [place] Level/Cues Needed (Transfer Goal 1, OT)  --  minimum assist (75% or more patient effort)  -TS     Time Frame (Transfer Goal 1, OT)  --  long term goal (LTG);by discharge  -TS     Progress/Outcome (Transfer Goal 1, OT)  --  goal not met  -TS        Dressing Goal 1 (OT)    Activity/Assistive Device (Dressing Goal 1, OT)  --  lower body dressing  -TS     [place]/Cues Needed (Dressing Goal 1, OT)  --  minimum assist (75% or more patient effort)  -TS     Time Frame (Dressing Goal 1, OT)  --  long term goal (LTG);by discharge  -TS     Progress/Outcome (Dressing Goal 1, OT)  --  goal not met  -TS        Toileting Goal 1 (OT)    Activity/Device (Toileting Goal 1, OT)  --  toileting skills, all;commode;commode, 3-in-1  -TS     [place] Level/Cues Needed (Toileting Goal 1, OT)  --  minimum assist (75% or more patient effort)  -TS     Time Frame (Toileting Goal 1, OT)  --  long term goal (LTG);by discharge  -TS     Progress/Outcome (Toileting Goal 1, OT)  --  goal not met  -TS       User Key  (r) = Recorded By, (t) = Taken By, (c) = Cosigned By    Initials Name Provider Type Discipline    TS Georgina Orozco, RICKS/L Occupational Therapy Assistant OT    NW Kimberly Garcia, PTA Physical Therapy Assistant PT              PT Discharge Summary  Anticipated Discharge Disposition (PT): skilled nursing facility  Reason for Discharge: Discharge from  facility  Outcomes Achieved: Refer to plan of care for updates on goals achieved  Discharge Destination: SNF      Kimberly Garcia, PTA   1/7/2020

## 2020-01-07 NOTE — THERAPY DISCHARGE NOTE
Acute Care - Occupational Therapy Discharge Summary  [facility]     Patient Name: Roberta Persaud  : 1931  MRN: [id number]    Today's Date: 2020  Onset of Illness/Injury or Date of Surgery: 19    Date of Referral to OT: 20  Referring Physician: Dr. Alvarado      Admit Date: 2020        OT Recommendation and Plan    Visit Dx:    ICD-10-CM ICD-9-CM   1. Closed displaced fracture of pelvis, unspecified part of pelvis, initial encounter (CMS/[facility]) S32.9XXA 808.8   2. KASANDRA (acute kidney injury) (CMS/[facility]) N17.9 584.9   3. Leukocytosis, unspecified type D72.829 288.60   4. Impaired mobility Z74.09 799.89   5. Impaired mobility and ADLs Z74.09 799.89               Rehab Goal Summary     Row Name 20 0700             Transfer Goal 1 (OT)    Activity/Assistive Device (Transfer Goal 1, OT)  sit-to-stand/stand-to-sit;bed-to-chair/chair-to-bed;toilet;tub;tub bench  -TS      [place] Level/Cues Needed (Transfer Goal 1, OT)  minimum assist (75% or more patient effort)  -TS      Time Frame (Transfer Goal 1, OT)  long term goal (LTG);by discharge  -TS      Progress/Outcome (Transfer Goal 1, OT)  goal not met  -TS         Dressing Goal 1 (OT)    Activity/Assistive Device (Dressing Goal 1, OT)  lower body dressing  -TS      [place]/Cues Needed (Dressing Goal 1, OT)  minimum assist (75% or more patient effort)  -TS      Time Frame (Dressing Goal 1, OT)  long term goal (LTG);by discharge  -TS      Progress/Outcome (Dressing Goal 1, OT)  goal not met  -TS         Toileting Goal 1 (OT)    Activity/Device (Toileting Goal 1, OT)  toileting skills, all;commode;commode, 3-in-1  -TS      [place] Level/Cues Needed (Toileting Goal 1, OT)  minimum assist (75% or more patient effort)  -TS      Time Frame (Toileting Goal 1, OT)  long term goal (LTG);by discharge  -TS      Progress/Outcome (Toileting Goal 1, OT)  goal not met  -TS        User Key  (r) = Recorded By, (t) = Taken By, (c) = Cosigned By     Initials Name Provider Type Discipline    TS Georgina Orozco COTA/L Occupational Therapy Assistant OT          Outcome Measures     Row Name 01/05/20 1100 01/04/20 1051 01/04/20 1000       How much help from another person do you currently need...    Turning from your back to your side while in flat bed without using bedrails?  3  -NW  --  2  -NW    Moving from lying on back to sitting on the side of a flat bed without bedrails?  3  -NW  --  2  -NW    Moving to and from a bed to a chair (including a wheelchair)?  3  -NW  --  3  -NW    Standing up from a chair using your arms (e.g., wheelchair, bedside chair)?  2  -NW  --  3  -NW    Climbing 3-5 steps with a railing?  1  -NW  --  1  -NW    To walk in hospital room?  2  -NW  --  2  -NW    AM-PAC 6 Clicks Score (PT)  14  -NW  --  13  -NW       How much help from another is currently needed...    Putting on and taking off regular lower body clothing?  --  2  -AO  --    Bathing (including washing, rinsing, and drying)  --  2  -AO  --    Toileting (which includes using toilet bed pan or urinal)  --  2  -AO  --    Putting on and taking off regular upper body clothing  --  3  -AO  --    Taking care of personal grooming (such as brushing teeth)  --  4  -AO  --    Eating meals  --  4  -AO  --    AM-PAC 6 Clicks Score (OT)  --  17  -AO  --       Functional Assessment    Outcome Measure Options  AM-PAC 6 Clicks Basic Mobility (PT)  -NW  --  AM-PAC 6 Clicks Basic Mobility (PT)  -NW      User Key  (r) = Recorded By, (t) = Taken By, (c) = Cosigned By    Initials Name Provider Type    NW Kimberly Garcia, ANNALISE Physical Therapy Assistant    Amy Ricci COTA/L Occupational Therapy Assistant          Therapy Suggested Charges     Code   Minutes Charges    75152 (CPT®) Hc Ot Neuromusc Re Education Ea 15 Min      66096 (CPT®) Hc Ot Ther Proc Ea 15 Min      83321 (CPT®) Hc Ot Therapeutic Act Ea 15 Min      03131 (CPT®) Hc Ot Manual Therapy Ea 15 Min      15302 (CPT®) Hc Ot  Iontophoresis Ea 15 Min      45248 (CPT®) Hc Ot Elec Stim Ea-Per 15 Min      27429 (CPT®) Hc Ot Ultrasound Ea 15 Min      42493 (CPT®) Hc Ot Self Care/Mgmt/Train Ea 15 Min 46 3    Total  46 3              OT Discharge Summary  Reason for Discharge: Discharge from facility  Outcomes Achieved: Refer to plan of care for updates on goals achieved  Discharge Destination: [facility]      MILLICENT Perez/GIANLUCA  1/7/2020

## 2021-05-12 NOTE — PLAN OF CARE
#90 clonazepam tabs sent in. Appears to be using med more frequently. How is he taking medication currently. Suggest starting additional maintenance medication if needing clonazepam more regularly.   PPP. Pain managed with po PRN pain medication for right pelvic fx. No surgery recommended. Therapy saw her and she stood at the side of the bed. Wheezing today and lasix and breathing treatments prn started. Continue to monitor. Son at bedside and would like to speak to the  for DC planning. Lovenox for DVT profilaxis. Monitor urinary put put.